# Patient Record
Sex: FEMALE | Race: WHITE | NOT HISPANIC OR LATINO
[De-identification: names, ages, dates, MRNs, and addresses within clinical notes are randomized per-mention and may not be internally consistent; named-entity substitution may affect disease eponyms.]

---

## 2017-01-05 ENCOUNTER — MEDICATION RENEWAL (OUTPATIENT)
Age: 67
End: 2017-01-05

## 2017-01-30 ENCOUNTER — APPOINTMENT (OUTPATIENT)
Dept: SURGERY | Facility: CLINIC | Age: 67
End: 2017-01-30

## 2017-01-30 ENCOUNTER — MEDICATION RENEWAL (OUTPATIENT)
Age: 67
End: 2017-01-30

## 2017-01-30 VITALS
WEIGHT: 293 LBS | HEART RATE: 84 BPM | SYSTOLIC BLOOD PRESSURE: 195 MMHG | TEMPERATURE: 97.6 F | BODY MASS INDEX: 48.82 KG/M2 | OXYGEN SATURATION: 97 % | RESPIRATION RATE: 16 BRPM | DIASTOLIC BLOOD PRESSURE: 123 MMHG | HEIGHT: 65 IN

## 2017-01-30 DIAGNOSIS — Z83.3 FAMILY HISTORY OF DIABETES MELLITUS: ICD-10-CM

## 2017-01-30 DIAGNOSIS — Z80.51 FAMILY HISTORY OF MALIGNANT NEOPLASM OF KIDNEY: ICD-10-CM

## 2017-01-30 DIAGNOSIS — E66.01 MORBID (SEVERE) OBESITY DUE TO EXCESS CALORIES: ICD-10-CM

## 2017-01-30 DIAGNOSIS — G89.29 DORSALGIA, UNSPECIFIED: ICD-10-CM

## 2017-01-30 DIAGNOSIS — M19.90 UNSPECIFIED OSTEOARTHRITIS, UNSPECIFIED SITE: ICD-10-CM

## 2017-01-30 DIAGNOSIS — M54.9 DORSALGIA, UNSPECIFIED: ICD-10-CM

## 2017-01-30 DIAGNOSIS — E78.00 PURE HYPERCHOLESTEROLEMIA, UNSPECIFIED: ICD-10-CM

## 2017-01-30 DIAGNOSIS — J45.909 UNSPECIFIED ASTHMA, UNCOMPLICATED: ICD-10-CM

## 2017-01-30 RX ORDER — SERTRALINE 25 MG/1
25 TABLET, FILM COATED ORAL
Refills: 0 | Status: ACTIVE | COMMUNITY

## 2017-02-03 ENCOUNTER — MEDICATION RENEWAL (OUTPATIENT)
Age: 67
End: 2017-02-03

## 2017-05-01 ENCOUNTER — OUTPATIENT (OUTPATIENT)
Dept: OUTPATIENT SERVICES | Facility: HOSPITAL | Age: 67
LOS: 1 days | End: 2017-05-01
Payer: MEDICAID

## 2017-05-01 DIAGNOSIS — Z96.651 PRESENCE OF RIGHT ARTIFICIAL KNEE JOINT: Chronic | ICD-10-CM

## 2017-05-05 DIAGNOSIS — R69 ILLNESS, UNSPECIFIED: ICD-10-CM

## 2017-05-08 ENCOUNTER — RX RENEWAL (OUTPATIENT)
Age: 67
End: 2017-05-08

## 2017-05-16 ENCOUNTER — APPOINTMENT (OUTPATIENT)
Dept: CARDIOLOGY | Facility: CLINIC | Age: 67
End: 2017-05-16

## 2017-05-17 ENCOUNTER — APPOINTMENT (OUTPATIENT)
Dept: CARDIOLOGY | Facility: CLINIC | Age: 67
End: 2017-05-17

## 2017-05-30 ENCOUNTER — RX RENEWAL (OUTPATIENT)
Age: 67
End: 2017-05-30

## 2017-06-01 PROCEDURE — G9001: CPT

## 2017-06-06 ENCOUNTER — MEDICATION RENEWAL (OUTPATIENT)
Age: 67
End: 2017-06-06

## 2017-06-14 ENCOUNTER — RX RENEWAL (OUTPATIENT)
Age: 67
End: 2017-06-14

## 2017-06-28 ENCOUNTER — MEDICATION RENEWAL (OUTPATIENT)
Age: 67
End: 2017-06-28

## 2017-06-29 ENCOUNTER — APPOINTMENT (OUTPATIENT)
Dept: CARDIOLOGY | Facility: CLINIC | Age: 67
End: 2017-06-29

## 2017-07-06 ENCOUNTER — APPOINTMENT (OUTPATIENT)
Dept: CARDIOLOGY | Facility: CLINIC | Age: 67
End: 2017-07-06
Payer: MEDICARE

## 2017-07-06 ENCOUNTER — NON-APPOINTMENT (OUTPATIENT)
Age: 67
End: 2017-07-06

## 2017-07-06 VITALS
TEMPERATURE: 98.4 F | DIASTOLIC BLOOD PRESSURE: 85 MMHG | WEIGHT: 293 LBS | OXYGEN SATURATION: 97 % | BODY MASS INDEX: 48.82 KG/M2 | SYSTOLIC BLOOD PRESSURE: 145 MMHG | HEART RATE: 80 BPM | HEIGHT: 65 IN

## 2017-07-06 PROCEDURE — 93000 ELECTROCARDIOGRAM COMPLETE: CPT

## 2017-07-06 PROCEDURE — 99215 OFFICE O/P EST HI 40 MIN: CPT

## 2017-07-28 ENCOUNTER — RX RENEWAL (OUTPATIENT)
Age: 67
End: 2017-07-28

## 2017-08-07 ENCOUNTER — RX RENEWAL (OUTPATIENT)
Age: 67
End: 2017-08-07

## 2017-08-11 ENCOUNTER — EMERGENCY (EMERGENCY)
Facility: HOSPITAL | Age: 67
LOS: 1 days | Discharge: HOME | End: 2017-08-11
Attending: EMERGENCY MEDICINE | Admitting: EMERGENCY MEDICINE
Payer: MEDICARE

## 2017-08-11 VITALS
SYSTOLIC BLOOD PRESSURE: 127 MMHG | RESPIRATION RATE: 20 BRPM | DIASTOLIC BLOOD PRESSURE: 68 MMHG | OXYGEN SATURATION: 99 % | TEMPERATURE: 99 F | HEART RATE: 94 BPM

## 2017-08-11 VITALS
OXYGEN SATURATION: 95 % | RESPIRATION RATE: 20 BRPM | DIASTOLIC BLOOD PRESSURE: 75 MMHG | HEART RATE: 75 BPM | SYSTOLIC BLOOD PRESSURE: 138 MMHG

## 2017-08-11 DIAGNOSIS — Z96.651 PRESENCE OF RIGHT ARTIFICIAL KNEE JOINT: Chronic | ICD-10-CM

## 2017-08-11 PROCEDURE — 99283 EMERGENCY DEPT VISIT LOW MDM: CPT | Mod: 25

## 2017-08-11 PROCEDURE — 73562 X-RAY EXAM OF KNEE 3: CPT | Mod: 26,50

## 2017-08-11 PROCEDURE — 73562 X-RAY EXAM OF KNEE 3: CPT

## 2017-08-11 RX ORDER — OXYCODONE HYDROCHLORIDE 5 MG/1
1 TABLET ORAL
Qty: 12 | Refills: 0 | OUTPATIENT
Start: 2017-08-11 | End: 2017-08-14

## 2017-08-11 RX ORDER — OXYCODONE HYDROCHLORIDE 5 MG/1
5 TABLET ORAL ONCE
Qty: 0 | Refills: 0 | Status: DISCONTINUED | OUTPATIENT
Start: 2017-08-11 | End: 2017-08-11

## 2017-08-11 RX ADMIN — OXYCODONE HYDROCHLORIDE 5 MILLIGRAM(S): 5 TABLET ORAL at 03:27

## 2017-08-11 NOTE — ED PROVIDER NOTE - ATTENDING CONTRIBUTION TO CARE
Patient with baseline arthritis of L knee, s/p R knee replacement approx 8 years ago presenting with intermittent non traumatic R knee pain.  Pain is sharp.  Patient takes oxycodone at baseline for back pain.  No fevers or chills, no new leg swelling, no recent travel.    On exam patient morbidly obese, vital signs within normal limits.  RLE without edema, no point tenderness, no noted erythema/warmth.    Plain films with normal appearing replacement joint of R knee, significant arthritis of L knee.  Possible artificial joint failure given duration from surgery and body habitus, no evidence of acute fracture, no evidence of septic arthritis, no evidence of DVT, will pain control and have follow up with orthopedist as outpatient.

## 2017-08-11 NOTE — ED PROVIDER NOTE - OBJECTIVE STATEMENT
67y Female PMH Afib, CHF, DM2, HLD, HTN PSH right knee replacement 8 years ago complaining of pain, knee. Started to have right knee pain 3 days ago. Unsure what started this. Pain getting more severe, unable to bear weight on it. Pain sometimes comes out of nowhere. Also reporting some pain in her left knee.    Orthopedics: Doctor in the [place] ([facility] - Dr. Gamez[facility]  PCP: DR. Zena Biggs ([phone]) 67y Female PMH Afib on Eliquis, CHF, DM2, HLD, HTN PSH right knee replacement 8 years ago complaining of pain, knee. Started to have right knee pain 3 days ago. Unsure what started this. Pain getting more severe, unable to bear weight on it. Pain sometimes comes out of nowhere, happens every 5-10 minutes. Also reporting some pain in her left knee, chronic, worsens when walking. Also with chronic back pain, does not feel like sciatica pain, does not go down the back of her leg. Some mild right lower extremity edema.    Orthopedics: Doctor in the [place] ([facility] - Dr. Gamez)'  PCP: DR. Zena Biggs ([phone])

## 2017-08-11 NOTE — ED ADULT NURSE NOTE - OBJECTIVE STATEMENT
pt c/o "worsening right knee pain x2 days worse today. the pain comes and goes and when I walk on it, the pain is worse." pt denies any injury to the knee. pt took 500 mg tylenol for pain approx. 1.5 hours PTA. swelling noted to right knee

## 2017-08-11 NOTE — ED ADULT NURSE NOTE - PMH
Afib    CHF (congestive heart failure)    DM (diabetes mellitus)    DM (diabetes mellitus), type 2    HLD (hyperlipidemia)    HLD (hyperlipidemia)    HTN (hypertension)    HTN (hypertension)

## 2017-08-11 NOTE — ED PROVIDER NOTE - PHYSICAL EXAMINATION
PE: CONSTITUTIONAL: Well appearing, well nourished, in no apparent distress. ENMT: Airway patent, nasal mucosa clear, mouth with normal mucosa. HEAD: NCAT EYES: PERRL, EOMI bilaterally CARDIAC: RRR, no m/r/g, 1+ pedal edema BL RESPIRATORY: CTA bilaterally, no adventitious sounds GI: Abdomen non-distended, non-tender MSK: Spine appears normal, range of motion is not limited, Left knee TTP, Right knee nontender to palpation NEURO: CNII-XII grossly intact, 5/5 strength, full sensation all extremities, gait intact SKIN: Skin tone normal in color, warm and dry. No evidence of rash.

## 2017-08-11 NOTE — ED PROVIDER NOTE - PLAN OF CARE
1) Please follow-up with your orthopedic doctor within the next 3 days.  Please call today or tomorrow for an appointment.  If you cannot follow-up with your doctor(s), please return to the ED for any urgent issues.  2) If you have any worsening of symptoms or any other concerns please return to the ED immediately.  3) Please continue taking your home medications as directed. Please take OXYCODONE every 6 hours, as needed, for SEVERE pain. Please do not drive, make any important decisions or operate heavy machinery while on this medication.

## 2017-08-11 NOTE — ED PROVIDER NOTE - CARE PLAN
Principal Discharge DX:	Knee pain, right  Instructions for follow-up, activity and diet:	1) Please follow-up with your orthopedic doctor within the next 3 days.  Please call today or tomorrow for an appointment.  If you cannot follow-up with your doctor(s), please return to the ED for any urgent issues.  2) If you have any worsening of symptoms or any other concerns please return to the ED immediately.  3) Please continue taking your home medications as directed. Please take OXYCODONE every 6 hours, as needed, for SEVERE pain. Please do not drive, make any important decisions or operate heavy machinery while on this medication.

## 2017-08-11 NOTE — ED PROVIDER NOTE - NS ED ROS FT
ROS: GENERAL: no fevers, no chills HEENT: no epistaxis, no eye pain, no ear pain, no throat pain CARDIAC: no chest pain, no shortness of breath PULM: no cough, no shortness of breath GI: no nausea, no vomiting, no diarrhea, no abdominal pain, no hematemesis, no bright red blood per rectum : no dysuria, no hematuria EXTREMITIES: no arm pain, + knee pain, +chronic back pain SKIN: no purpura, no petechiae NEURO: no headache, no neck pain, no loss of strength/sensation HEME: no easy bruising, no easy bleeding

## 2017-11-06 ENCOUNTER — RX RENEWAL (OUTPATIENT)
Age: 67
End: 2017-11-06

## 2017-12-14 ENCOUNTER — RX RENEWAL (OUTPATIENT)
Age: 67
End: 2017-12-14

## 2018-01-09 ENCOUNTER — NON-APPOINTMENT (OUTPATIENT)
Age: 68
End: 2018-01-09

## 2018-01-09 ENCOUNTER — APPOINTMENT (OUTPATIENT)
Dept: CARDIOLOGY | Facility: CLINIC | Age: 68
End: 2018-01-09
Payer: MEDICARE

## 2018-01-09 VITALS — OXYGEN SATURATION: 99 % | WEIGHT: 293 LBS | HEART RATE: 88 BPM | BODY MASS INDEX: 52.09 KG/M2

## 2018-01-09 DIAGNOSIS — I65.23 OCCLUSION AND STENOSIS OF BILATERAL CAROTID ARTERIES: ICD-10-CM

## 2018-01-09 PROCEDURE — 99214 OFFICE O/P EST MOD 30 MIN: CPT

## 2018-01-09 PROCEDURE — 93000 ELECTROCARDIOGRAM COMPLETE: CPT

## 2018-01-09 PROCEDURE — 36415 COLL VENOUS BLD VENIPUNCTURE: CPT

## 2018-01-10 ENCOUNTER — NON-APPOINTMENT (OUTPATIENT)
Age: 68
End: 2018-01-10

## 2018-01-12 LAB
ALBUMIN SERPL ELPH-MCNC: 4.1 G/DL
ALP BLD-CCNC: 84 U/L
ALT SERPL-CCNC: 25 U/L
ANION GAP SERPL CALC-SCNC: 15 MMOL/L
AST SERPL-CCNC: 28 U/L
BASOPHILS # BLD AUTO: 0.02 K/UL
BASOPHILS NFR BLD AUTO: 0.3 %
BILIRUB SERPL-MCNC: 0.4 MG/DL
BUN SERPL-MCNC: 27 MG/DL
CALCIUM SERPL-MCNC: 10.7 MG/DL
CHLORIDE SERPL-SCNC: 100 MMOL/L
CHOLEST SERPL-MCNC: 159 MG/DL
CHOLEST/HDLC SERPL: 2.9 RATIO
CO2 SERPL-SCNC: 26 MMOL/L
CREAT SERPL-MCNC: 1.13 MG/DL
EOSINOPHIL # BLD AUTO: 0.12 K/UL
EOSINOPHIL NFR BLD AUTO: 1.7 %
GLUCOSE SERPL-MCNC: 131 MG/DL
HBA1C MFR BLD HPLC: 7.2 %
HCT VFR BLD CALC: 43.5 %
HDLC SERPL-MCNC: 54 MG/DL
HGB BLD-MCNC: 13.8 G/DL
IMM GRANULOCYTES NFR BLD AUTO: 0.1 %
LDLC SERPL CALC-MCNC: 79 MG/DL
LYMPHOCYTES # BLD AUTO: 1.98 K/UL
LYMPHOCYTES NFR BLD AUTO: 28.1 %
MAN DIFF?: NORMAL
MCHC RBC-ENTMCNC: 29.3 PG
MCHC RBC-ENTMCNC: 31.7 GM/DL
MCV RBC AUTO: 92.4 FL
MONOCYTES # BLD AUTO: 0.54 K/UL
MONOCYTES NFR BLD AUTO: 7.7 %
NEUTROPHILS # BLD AUTO: 4.38 K/UL
NEUTROPHILS NFR BLD AUTO: 62.1 %
NT-PROBNP SERPL-MCNC: 1427 PG/ML
PLATELET # BLD AUTO: 209 K/UL
POTASSIUM SERPL-SCNC: 4.4 MMOL/L
PROT SERPL-MCNC: 8.2 G/DL
RBC # BLD: 4.71 M/UL
RBC # FLD: 13 %
SODIUM SERPL-SCNC: 141 MMOL/L
TRIGL SERPL-MCNC: 132 MG/DL
WBC # FLD AUTO: 7.05 K/UL

## 2018-01-23 ENCOUNTER — RX RENEWAL (OUTPATIENT)
Age: 68
End: 2018-01-23

## 2018-03-07 ENCOUNTER — RX RENEWAL (OUTPATIENT)
Age: 68
End: 2018-03-07

## 2018-06-22 ENCOUNTER — EMERGENCY (EMERGENCY)
Facility: HOSPITAL | Age: 68
LOS: 1 days | Discharge: HOME | End: 2018-06-22
Attending: EMERGENCY MEDICINE
Payer: MEDICARE

## 2018-06-22 VITALS
WEIGHT: 293 LBS | RESPIRATION RATE: 18 BRPM | HEIGHT: 66 IN | HEART RATE: 91 BPM | SYSTOLIC BLOOD PRESSURE: 132 MMHG | OXYGEN SATURATION: 96 % | TEMPERATURE: 98 F | DIASTOLIC BLOOD PRESSURE: 88 MMHG

## 2018-06-22 DIAGNOSIS — Z96.651 PRESENCE OF RIGHT ARTIFICIAL KNEE JOINT: Chronic | ICD-10-CM

## 2018-06-22 PROCEDURE — 99283 EMERGENCY DEPT VISIT LOW MDM: CPT

## 2018-06-22 PROCEDURE — 73030 X-RAY EXAM OF SHOULDER: CPT | Mod: 26,RT

## 2018-06-22 RX ORDER — ONDANSETRON 8 MG/1
4 TABLET, FILM COATED ORAL ONCE
Qty: 0 | Refills: 0 | Status: COMPLETED | OUTPATIENT
Start: 2018-06-22 | End: 2018-06-22

## 2018-06-22 RX ORDER — OXYCODONE AND ACETAMINOPHEN 5; 325 MG/1; MG/1
1 TABLET ORAL ONCE
Qty: 0 | Refills: 0 | Status: DISCONTINUED | OUTPATIENT
Start: 2018-06-22 | End: 2018-06-22

## 2018-06-22 RX ORDER — MORPHINE SULFATE 50 MG/1
8 CAPSULE, EXTENDED RELEASE ORAL ONCE
Qty: 0 | Refills: 0 | Status: DISCONTINUED | OUTPATIENT
Start: 2018-06-22 | End: 2018-06-22

## 2018-06-22 NOTE — ED ADULT TRIAGE NOTE - CHIEF COMPLAINT QUOTE
BIBA with c/o severe pain to rt. shoulder,s/p slipped and fell in her kitchen and hit the right shoulder on  the floor,denies head injury as per ems

## 2018-06-22 NOTE — ED PROVIDER NOTE - MUSCULOSKELETAL MINIMAL EXAM
right shoulder and humeral pain. distalr giht pulses intact. hand  strength strong. neuro intact. cannot range shoulder 2/2 pain.

## 2018-06-22 NOTE — ED PROVIDER NOTE - OBJECTIVE STATEMENT
68F p/w right shoulder pain. mechanical slip and fall. Patient slipped while carrying food at home. fell onto right upper arm / shoulder. c/o shoulder pain. no Head injury. no N/V. no LOC. no pain elsewhere. Patient on eloquis for afib.

## 2018-06-23 VITALS
SYSTOLIC BLOOD PRESSURE: 123 MMHG | DIASTOLIC BLOOD PRESSURE: 74 MMHG | HEART RATE: 90 BPM | TEMPERATURE: 98 F | RESPIRATION RATE: 20 BRPM | OXYGEN SATURATION: 98 %

## 2018-06-23 PROCEDURE — 73030 X-RAY EXAM OF SHOULDER: CPT

## 2018-06-23 PROCEDURE — 96372 THER/PROPH/DIAG INJ SC/IM: CPT | Mod: 76

## 2018-06-23 PROCEDURE — 99283 EMERGENCY DEPT VISIT LOW MDM: CPT | Mod: 25

## 2018-06-23 RX ADMIN — MORPHINE SULFATE 8 MILLIGRAM(S): 50 CAPSULE, EXTENDED RELEASE ORAL at 01:40

## 2018-06-23 RX ADMIN — ONDANSETRON 4 MILLIGRAM(S): 8 TABLET, FILM COATED ORAL at 00:00

## 2018-06-23 RX ADMIN — MORPHINE SULFATE 8 MILLIGRAM(S): 50 CAPSULE, EXTENDED RELEASE ORAL at 00:00

## 2018-06-23 NOTE — ED ADULT NURSE NOTE - OBJECTIVE STATEMENT
presented  to ed s/p fall c/o right shoulder pain.  Patient slipped while carrying food at home. fell onto right upper arm.denies head  trauma. noLOC . medicated with  morphine & zofran

## 2018-06-24 NOTE — ED PROCEDURE NOTE - NS ED PERI VASCULAR NEG
capillary refill time < 2 seconds/no paresthesia/fingers/toes warm to touch/no cyanosis of extremity/no swelling

## 2018-06-27 ENCOUNTER — APPOINTMENT (OUTPATIENT)
Dept: CARDIOLOGY | Facility: CLINIC | Age: 68
End: 2018-06-27
Payer: MEDICARE

## 2018-06-27 ENCOUNTER — NON-APPOINTMENT (OUTPATIENT)
Age: 68
End: 2018-06-27

## 2018-06-27 VITALS
WEIGHT: 208 LBS | DIASTOLIC BLOOD PRESSURE: 85 MMHG | BODY MASS INDEX: 34.66 KG/M2 | OXYGEN SATURATION: 95 % | SYSTOLIC BLOOD PRESSURE: 136 MMHG | HEART RATE: 104 BPM | HEIGHT: 65 IN

## 2018-06-27 PROCEDURE — 93000 ELECTROCARDIOGRAM COMPLETE: CPT

## 2018-06-27 PROCEDURE — 99215 OFFICE O/P EST HI 40 MIN: CPT

## 2018-06-27 RX ORDER — DICLOFENAC SODIUM 20 MG/G
2 SOLUTION TOPICAL
Qty: 112 | Refills: 0 | Status: DISCONTINUED | COMMUNITY
Start: 2018-06-22

## 2018-06-27 RX ORDER — GABAPENTIN 300 MG/1
300 TABLET, FILM COATED ORAL
Qty: 60 | Refills: 0 | Status: DISCONTINUED | COMMUNITY
Start: 2018-06-22

## 2018-06-27 RX ORDER — METFORMIN HYDROCHLORIDE 500 MG/1
500 TABLET, COATED ORAL
Qty: 30 | Refills: 0 | Status: DISCONTINUED | COMMUNITY
Start: 2018-01-17

## 2018-06-27 RX ORDER — PREGABALIN 75 MG/1
75 CAPSULE ORAL
Qty: 90 | Refills: 0 | Status: DISCONTINUED | COMMUNITY
Start: 2018-06-14

## 2018-06-27 RX ORDER — AZITHROMYCIN 250 MG/1
250 TABLET, FILM COATED ORAL
Qty: 6 | Refills: 0 | Status: DISCONTINUED | COMMUNITY
Start: 2018-06-07

## 2018-06-27 RX ORDER — NEBIVOLOL HYDROCHLORIDE 20 MG/1
20 TABLET ORAL
Qty: 30 | Refills: 0 | Status: DISCONTINUED | COMMUNITY
Start: 2018-03-07

## 2018-06-27 RX ORDER — OXYCODONE AND ACETAMINOPHEN 5; 325 MG/1; MG/1
5-325 TABLET ORAL
Qty: 32 | Refills: 0 | Status: DISCONTINUED | COMMUNITY
Start: 2018-06-23

## 2018-06-27 RX ORDER — BUDESONIDE AND FORMOTEROL FUMARATE DIHYDRATE 80; 4.5 UG/1; UG/1
80-4.5 AEROSOL RESPIRATORY (INHALATION)
Qty: 10 | Refills: 0 | Status: DISCONTINUED | COMMUNITY
Start: 2018-02-27

## 2018-06-27 RX ORDER — PIOGLITAZONE HYDROCHLORIDE 15 MG/1
15 TABLET ORAL
Qty: 30 | Refills: 0 | Status: DISCONTINUED | COMMUNITY
Start: 2018-01-17

## 2018-06-27 RX ORDER — ALBUTEROL SULFATE 90 UG/1
108 (90 BASE) AEROSOL, METERED RESPIRATORY (INHALATION)
Qty: 18 | Refills: 0 | Status: DISCONTINUED | COMMUNITY
Start: 2018-02-27

## 2018-06-27 RX ORDER — TRAMADOL HYDROCHLORIDE 50 MG/1
50 TABLET, COATED ORAL
Qty: 60 | Refills: 0 | Status: DISCONTINUED | COMMUNITY
Start: 2018-06-26

## 2018-11-19 ENCOUNTER — RX RENEWAL (OUTPATIENT)
Age: 68
End: 2018-11-19

## 2019-01-08 ENCOUNTER — RX RENEWAL (OUTPATIENT)
Age: 69
End: 2019-01-08

## 2019-01-18 ENCOUNTER — RX RENEWAL (OUTPATIENT)
Age: 69
End: 2019-01-18

## 2019-02-25 NOTE — ED ADULT NURSE NOTE - NS PRO PASSIVE SMOKE EXP
Per Dr Snider:  Jeanette Snider MD   You 3 days ago      She can skip the appointment. She can go back to work in 7-10 days if she does not do lifting. She will need Preop medical clearance.    Routing Comment      Left message for patient to call back.     Unknown

## 2019-02-27 ENCOUNTER — RX RENEWAL (OUTPATIENT)
Age: 69
End: 2019-02-27

## 2019-04-25 ENCOUNTER — RX RENEWAL (OUTPATIENT)
Age: 69
End: 2019-04-25

## 2019-04-29 ENCOUNTER — RX RENEWAL (OUTPATIENT)
Age: 69
End: 2019-04-29

## 2019-05-21 ENCOUNTER — RX RENEWAL (OUTPATIENT)
Age: 69
End: 2019-05-21

## 2019-05-24 ENCOUNTER — RESULT REVIEW (OUTPATIENT)
Age: 69
End: 2019-05-24

## 2019-05-29 ENCOUNTER — RX RENEWAL (OUTPATIENT)
Age: 69
End: 2019-05-29

## 2019-06-21 ENCOUNTER — RX RENEWAL (OUTPATIENT)
Age: 69
End: 2019-06-21

## 2019-06-27 ENCOUNTER — MEDICATION RENEWAL (OUTPATIENT)
Age: 69
End: 2019-06-27

## 2019-06-27 RX ORDER — NEBIVOLOL HYDROCHLORIDE 10 MG/1
10 TABLET ORAL
Qty: 30 | Refills: 0 | Status: ACTIVE | COMMUNITY
Start: 2017-01-30 | End: 1900-01-01

## 2019-09-23 ENCOUNTER — RX RENEWAL (OUTPATIENT)
Age: 69
End: 2019-09-23

## 2020-01-09 PROBLEM — I10 ESSENTIAL (PRIMARY) HYPERTENSION: Chronic | Status: ACTIVE | Noted: 2018-06-22

## 2020-01-09 PROBLEM — I48.91 UNSPECIFIED ATRIAL FIBRILLATION: Chronic | Status: ACTIVE | Noted: 2018-06-22

## 2020-01-09 PROBLEM — E11.9 TYPE 2 DIABETES MELLITUS WITHOUT COMPLICATIONS: Chronic | Status: ACTIVE | Noted: 2018-06-22

## 2020-01-28 ENCOUNTER — NON-APPOINTMENT (OUTPATIENT)
Age: 70
End: 2020-01-28

## 2020-01-28 ENCOUNTER — APPOINTMENT (OUTPATIENT)
Dept: CARDIOLOGY | Facility: CLINIC | Age: 70
End: 2020-01-28
Payer: MEDICARE

## 2020-01-28 VITALS — SYSTOLIC BLOOD PRESSURE: 168 MMHG | HEART RATE: 80 BPM | DIASTOLIC BLOOD PRESSURE: 79 MMHG

## 2020-01-28 VITALS
BODY MASS INDEX: 46.15 KG/M2 | DIASTOLIC BLOOD PRESSURE: 111 MMHG | WEIGHT: 277 LBS | HEIGHT: 65 IN | SYSTOLIC BLOOD PRESSURE: 162 MMHG | HEART RATE: 80 BPM | OXYGEN SATURATION: 98 %

## 2020-01-28 DIAGNOSIS — I25.10 ATHEROSCLEROTIC HEART DISEASE OF NATIVE CORONARY ARTERY W/OUT ANGINA PECTORIS: ICD-10-CM

## 2020-01-28 DIAGNOSIS — I10 ESSENTIAL (PRIMARY) HYPERTENSION: ICD-10-CM

## 2020-01-28 DIAGNOSIS — E55.9 VITAMIN D DEFICIENCY, UNSPECIFIED: ICD-10-CM

## 2020-01-28 PROCEDURE — 99214 OFFICE O/P EST MOD 30 MIN: CPT

## 2020-01-28 PROCEDURE — 93000 ELECTROCARDIOGRAM COMPLETE: CPT

## 2020-01-28 NOTE — PHYSICAL EXAM
[Normal Appearance] : normal appearance [Well Groomed] : well groomed [General Appearance - In No Acute Distress] : no acute distress [No Deformities] : no deformities [Eyelids - No Xanthelasma] : the eyelids demonstrated no xanthelasmas [Normal Conjunctiva] : the conjunctiva exhibited no abnormalities [No Oral Pallor] : no oral pallor [Normal Oral Mucosa] : normal oral mucosa [No Oral Cyanosis] : no oral cyanosis [Respiration, Rhythm And Depth] : normal respiratory rhythm and effort [Auscultation Breath Sounds / Voice Sounds] : lungs were clear to auscultation bilaterally [Exaggerated Use Of Accessory Muscles For Inspiration] : no accessory muscle use [Murmurs] : no murmurs present [Heart Sounds] : normal S1 and S2 [Heart Rate And Rhythm] : heart rate and rhythm were normal [Abdomen Soft] : soft [Abdomen Mass (___ Cm)] : no abdominal mass palpated [Abdomen Tenderness] : non-tender [Nail Clubbing] : no clubbing of the fingernails [Abnormal Walk] : normal gait [Cyanosis, Localized] : no localized cyanosis [Petechial Hemorrhages (___cm)] : no petechial hemorrhages [Skin Color & Pigmentation] : normal skin color and pigmentation [] : no rash [No Skin Ulcers] : no skin ulcer [No Venous Stasis] : no venous stasis [Skin Lesions] : no skin lesions [No Xanthoma] : no  xanthoma was observed [Oriented To Time, Place, And Person] : oriented to person, place, and time [Mood] : the mood was normal [Affect] : the affect was normal [No Anxiety] : not feeling anxious [FreeTextEntry1] : Mild chronic nonpitting edema.  Pedal pulses intact

## 2020-01-28 NOTE — HISTORY OF PRESENT ILLNESS
[FreeTextEntry1] : March 10, 2016. Long complicated story. Patient doesn't speak much English but is here with her daughter. She has had hypertension for probably 40 years and used to be difficult to control even with labetalol and clonidine. In addition she has had diabetes for about 6 years and hyperlipidemia for about 4 years. She presented to [facility] on March 13, 2015 complaining of 2-3 weeks of dyspnea on exertion and edema. At the time of admission she was in rapid atrial fibrillation and her blood pressure was 220/160. She was treated by Dr. Nicole  initially with Cardizem, followed by a SHAKEEL cardioversion and discharged on sotalol 80 q.12 h. and Xarelto. In addition her regimen was changed from clonidine and labetalol initially to lisinopril but she developed a cough and now she is on losartan//12.5 along with amlodipine 5 mg. On this regimen she has had fairly good blood pressure control.  She was also changed from Xarelto to Eliquis 5 mg b.i.d. About 4 months ago he saw her and she was in atrial fibrillation. It may be that Dr. Nicole in conjunction with her PCP lowered the sotalol to once a day, possibly concerned about asthma or beta blocker side effects. In any case she was in atrial fibrillation again so he changed her to Multaq. He did not see her again until 4 months later and at that point (3/3) she was still in atrial fibrillation and he said to stop the Multaq and gave her a prescription for metoprolol and possibly digoxin. The daughter was somewhat concerned about this and did not fill either prescription or stop the Multaq. At the same time the daughter was having her mother evaluated for weight-reduction surgery and now she is here for my evaluation. She does complain about orthopnea and pedal edema and cannot walk very far without shortness of breath. No exertional chest pain. \par During the hospitalization in March of 2015 she had a coronary angiogram. There was nonobstructive coronary artery disease with a 25% lesion in the left main and 50% lesion in the mid LAD and first diagonal. No significant lesions in the right circumflex. LVEF estimated at 55%. High LVEDP, pulmonary pressures, systemic resistance. Her echocardiogram was suboptimal but she did have a transesophageal echo as well and she was felt to have normal LV systolic function with concentric LVH, the extent of which was not mentioned on either the transthoracic and transesophageal echo.  \par March 18, 2016. Patient returns for followup and echo. She doesn't feel any different off Multaq and on bisoprolol. Heart rate is well controlled in the 70s. Echocardiogram was suboptimal because endocardium could not be well visualized, but grossly normal LV function and only mild left atrial enlargement. No severe valvular disease. RVSP 41 mm of mercury. I had a long with the patient and her daughter who was with her as she is compliant with the Eliquis and, therefore, we're starting flecainide 50 mg q.12 h.\par \par September 19, 2016.  Patient is here for preop evaluation for laparoscopic duodenal switch procedure.  Since March, the patient did not tolerate the Flecainide and did not convert to sinus rhythm and we have elected to just control her rate and leave her on anticoagulation. She has had labile hypertension with severe "white coat" hypertension at times, and other times fairly normal blood pressures on just losartan/HCT, and bisoprolol. She has managed to lose about 13 pounds. She denies exertional chest pain or change in her shortness of breath. Her EKG was within normal limits other than her atrial fibrillation, and I elected to change her by bisoprolol to bystolic 10 mg daily.\par December 19, 2016. Weight reduction surgeons want her to lose more weight before the surgery, but she is unable to. She has no other complaints except her blood pressure remains erratic. Her internist lowered her losartan to 25 mg, probably by error  as she was on Losartan//25. Her blood pressure here was still elevated, and if her labs are within normal limits. I will resume Losartan//25. I also recommended she consider seeing the bariatric surgery doctors at [facility]. We also discussed the pros and cons of Eliquis versus Xarelto as her insurance will only cover the Xarelto.\par July 6, 2017. First visit since December, as I would not refill her medications. She brought with her labs from [facility] with a BUN of 28 and creatinine of 0.9. Potassium 4.6. Cholesterol 160, HDL 50, LDL 91, triglycerides 96, glucose 121, with hemoglobin A1c 6.8, C-reactive protein, negative, TSH 3.2, normal CBC, vitamin D. Positive microalbumin. In addition, she had carotid Dopplers that showed a 45% stenosis right carotid 20-30% right mid ICA. 42% left mid common carotid with 40% left carotid bulb with 46% left proximal ICA 50-70% left mid and distal RCA. Internist was concerned because these #"s are worse than one year ago.\par January 9, 2018. Patient returns in followup. Multiple nonspecific complaints with atypical chest pain, shortness of breath, nothing that fits for exertional angina or for congestive heart failure. Patient has been unable to lose weight and is up another 10-13 pounds. Exam is otherwise unremarkable and the EKG is atrial fibrillation with ventricular rate controlled and otherwise within normal limits. Patient again claims whenever I increased her bystolic, her blood pressure became too low.\par June 27, 2018. Seen by Dr. Lisker. She was seen today for preoperative cardiovascular evaluation prior to her planned operative repair of a right proximal humerus and humerus shaft fracture scheduled on July 2, 2018.\par She is a 68-year-old morbidly obese woman with history of chronic atrial fibrillation and moderate nonobstructive coronary artery disease who slipped and fell and broke her right arm. She reports being in significant pain despite pain medication use including Percocet and Toradol.\par She has been on and off Eliquis over the past week, as she thought that her surgery would be sooner. She is also taking her blood pressure medications intermittently because of low blood pressure readings that she treated to pain treatment with narcotics.\par January 28, 2020.  First visit in a year and a half.  According to the daughter at one point she had lost 70 pounds (down to 247) and her blood pressure was much better controlled but since then she has put back some of the weight down for other reasons that are unclear her blood pressure seems to be high again.  She had been on Bystolic and had been taking as much as 2 or 3 times a day.  Still has some problems with her hand and shoulder after her surgery.  No other change in medication.  Remains in atrial fibrillation with ventricular rate of 89 and otherwise normal ECG.  Using a thigh blood pressure cuff on her right arm her blood pressure was not quite so alarmingly high (168/79).\par \par

## 2020-01-28 NOTE — REASON FOR VISIT
[FreeTextEntry1] : Followup for a woman with obesity, awaiting weight reduction surgery (canceled 3 times), along with atrial fibrillation, Hypertension, and nonobstructive coronary artery disease. s/p right arm surgery.

## 2020-01-28 NOTE — REVIEW OF SYSTEMS
[see HPI] : see HPI [Dyspnea on exertion] : dyspnea during exertion [Lower Back Pain] : lower back pain [Negative] : Heme/Lymph [Recent Weight Gain (___ Lbs)] : recent [unfilled] ~Ulb weight gain [Recent Weight Loss (___ Lbs)] : no recent weight loss [Shortness Of Breath] : no shortness of breath [Chest  Pressure] : no chest pressure [Chest Pain] : no chest pain [Lower Ext Edema] : no extremity edema [Palpitations] : no palpitations [Leg Claudication] : no intermittent leg claudication [Depression] : no depression [Suicidal] : not suicidal

## 2020-01-29 LAB
ALBUMIN SERPL ELPH-MCNC: 4.5 G/DL
ALP BLD-CCNC: 78 U/L
ALT SERPL-CCNC: 28 U/L
ANION GAP SERPL CALC-SCNC: 11 MMOL/L
AST SERPL-CCNC: 23 U/L
BASOPHILS # BLD AUTO: 0.05 K/UL
BASOPHILS NFR BLD AUTO: 0.7 %
BILIRUB SERPL-MCNC: 0.5 MG/DL
BUN SERPL-MCNC: 30 MG/DL
CALCIUM SERPL-MCNC: 10.6 MG/DL
CHLORIDE SERPL-SCNC: 102 MMOL/L
CHOLEST SERPL-MCNC: 162 MG/DL
CHOLEST/HDLC SERPL: 2.7 RATIO
CO2 SERPL-SCNC: 27 MMOL/L
CREAT SERPL-MCNC: 1.05 MG/DL
EOSINOPHIL # BLD AUTO: 0.1 K/UL
EOSINOPHIL NFR BLD AUTO: 1.5 %
ESTIMATED AVERAGE GLUCOSE: 134 MG/DL
GLUCOSE SERPL-MCNC: 111 MG/DL
HBA1C MFR BLD HPLC: 6.3 %
HCT VFR BLD CALC: 45.5 %
HDLC SERPL-MCNC: 59 MG/DL
HGB BLD-MCNC: 13.7 G/DL
IMM GRANULOCYTES NFR BLD AUTO: 0.3 %
LDLC SERPL CALC-MCNC: 84 MG/DL
LYMPHOCYTES # BLD AUTO: 1.53 K/UL
LYMPHOCYTES NFR BLD AUTO: 22.3 %
MAN DIFF?: NORMAL
MCHC RBC-ENTMCNC: 29 PG
MCHC RBC-ENTMCNC: 30.1 GM/DL
MCV RBC AUTO: 96.4 FL
MONOCYTES # BLD AUTO: 0.42 K/UL
MONOCYTES NFR BLD AUTO: 6.1 %
NEUTROPHILS # BLD AUTO: 4.75 K/UL
NEUTROPHILS NFR BLD AUTO: 69.1 %
NT-PROBNP SERPL-MCNC: 1294 PG/ML
PLATELET # BLD AUTO: 218 K/UL
POTASSIUM SERPL-SCNC: 4.9 MMOL/L
PROT SERPL-MCNC: 7.2 G/DL
RBC # BLD: 4.72 M/UL
RBC # FLD: 12.1 %
SODIUM SERPL-SCNC: 141 MMOL/L
TRIGL SERPL-MCNC: 94 MG/DL
TSH SERPL-ACNC: 3.04 UIU/ML
WBC # FLD AUTO: 6.87 K/UL

## 2020-07-28 ENCOUNTER — RX RENEWAL (OUTPATIENT)
Age: 70
End: 2020-07-28

## 2020-09-03 ENCOUNTER — RX RENEWAL (OUTPATIENT)
Age: 70
End: 2020-09-03

## 2020-11-02 ENCOUNTER — RX RENEWAL (OUTPATIENT)
Age: 70
End: 2020-11-02

## 2020-11-02 RX ORDER — AMLODIPINE BESYLATE 5 MG/1
5 TABLET ORAL DAILY
Qty: 90 | Refills: 0 | Status: ACTIVE | COMMUNITY
Start: 2020-01-29 | End: 1900-01-01

## 2020-11-04 ENCOUNTER — EMERGENCY (EMERGENCY)
Facility: HOSPITAL | Age: 70
LOS: 1 days | Discharge: HOME | End: 2020-11-04
Attending: EMERGENCY MEDICINE
Payer: MEDICARE

## 2020-11-04 VITALS
SYSTOLIC BLOOD PRESSURE: 210 MMHG | OXYGEN SATURATION: 99 % | HEART RATE: 106 BPM | TEMPERATURE: 98 F | HEIGHT: 66 IN | DIASTOLIC BLOOD PRESSURE: 85 MMHG | WEIGHT: 259.93 LBS | RESPIRATION RATE: 18 BRPM

## 2020-11-04 DIAGNOSIS — Z96.651 PRESENCE OF RIGHT ARTIFICIAL KNEE JOINT: Chronic | ICD-10-CM

## 2020-11-05 VITALS
TEMPERATURE: 98 F | RESPIRATION RATE: 18 BRPM | SYSTOLIC BLOOD PRESSURE: 145 MMHG | OXYGEN SATURATION: 99 % | HEART RATE: 90 BPM | DIASTOLIC BLOOD PRESSURE: 88 MMHG

## 2020-11-05 PROCEDURE — 99283 EMERGENCY DEPT VISIT LOW MDM: CPT

## 2020-11-05 RX ORDER — OXYMETAZOLINE HYDROCHLORIDE 0.5 MG/ML
2 SPRAY NASAL ONCE
Refills: 0 | Status: COMPLETED | OUTPATIENT
Start: 2020-11-05 | End: 2020-11-05

## 2020-11-05 RX ADMIN — OXYMETAZOLINE HYDROCHLORIDE 2 SPRAY(S): 0.5 SPRAY NASAL at 01:01

## 2020-11-05 NOTE — ED ADULT NURSE NOTE - OBJECTIVE STATEMENT
Pt presents to ED with c/o nosebleed an hour prior to arrival. Pt reports she fell on Friday. Bruise noted to left eye. Pt reports while sitting she noticed her bleeding from her nose.

## 2020-11-05 NOTE — ED PROVIDER NOTE - CLINICAL SUMMARY MEDICAL DECISION MAKING FREE TEXT BOX
70 year old female with epistaxis. PE as above. 70 year old female with epistaxis. PE as above.  direct pressure and afrin and bleeding stopped. observed, no active bleeding. asking to go home. will dc. f/u with ENT. return precautions discussed.

## 2020-11-05 NOTE — ED PROVIDER NOTE - PMH
Afib    Atrial fibrillation    CHF (congestive heart failure)    DM (diabetes mellitus)    DM (diabetes mellitus)    DM (diabetes mellitus), type 2    HLD (hyperlipidemia)    HLD (hyperlipidemia)    HTN (hypertension)    HTN (hypertension)    HTN (hypertension)     yes

## 2020-11-05 NOTE — ED PROVIDER NOTE - OBJECTIVE STATEMENT
70 year old female PMH afib on eliquis, DM, HTN coming in for epistaxis for the past hour. states started spontaneously, unsure which nare it started to come from. states some blood clots present. denies blowing nose, trauma to nose, picking nose. denies all other complaints.

## 2020-11-05 NOTE — ED ADULT NURSE NOTE - INTERVENTIONS DEFINITIONS
Instruct patient to call for assistance/Monitor for mental status changes and reorient to person, place, and time/Reinforce activity limits and safety measures with patient and family/Room bathroom lighting operational/Monitor gait and stability/Physically safe environment: no spills, clutter or unnecessary equipment

## 2020-11-05 NOTE — ED ADULT NURSE NOTE - CHIEF COMPLAINT QUOTE
BIBA for nose bleed x 1 hour, pt on Eliquis, pt has high blood pressure, pt fell 3 days ago and has left orbital bruising

## 2020-11-05 NOTE — ED PROVIDER NOTE - PROGRESS NOTE DETAILS
given afrin and used direct pressure. bleeding stopped. given afrin and used direct pressure. bleeding stopped. asking to go home.

## 2020-11-05 NOTE — ED ADULT NURSE NOTE - CHPI ED NUR SYMPTOMS NEG
no nausea/no numbness/no vomiting/no syncope/no weakness/no chills/no fever/no loss of consciousness/no bleeding gums

## 2020-11-05 NOTE — ED PROVIDER NOTE - PATIENT PORTAL LINK FT
You can access the FollowMyHealth Patient Portal offered by [facility] by registering at the following website: [email or web address]. By joining [facility]’s FollowMyHealth portal, you will also be able to view your health information using other applications (apps) compatible with our system.

## 2022-02-11 ENCOUNTER — APPOINTMENT (OUTPATIENT)
Dept: CARDIOLOGY | Facility: CLINIC | Age: 72
End: 2022-02-11

## 2022-10-20 ENCOUNTER — APPOINTMENT (OUTPATIENT)
Dept: CARDIOLOGY | Facility: CLINIC | Age: 72
End: 2022-10-20

## 2022-10-20 ENCOUNTER — NON-APPOINTMENT (OUTPATIENT)
Age: 72
End: 2022-10-20

## 2022-10-20 VITALS
SYSTOLIC BLOOD PRESSURE: 137 MMHG | BODY MASS INDEX: 46.59 KG/M2 | OXYGEN SATURATION: 100 % | DIASTOLIC BLOOD PRESSURE: 88 MMHG | WEIGHT: 280 LBS | HEART RATE: 79 BPM

## 2022-10-20 DIAGNOSIS — E11.43 TYPE 2 DIABETES MELLITUS WITH DIABETIC AUTONOMIC (POLY)NEUROPATHY: ICD-10-CM

## 2022-10-20 DIAGNOSIS — I48.91 UNSPECIFIED ATRIAL FIBRILLATION: ICD-10-CM

## 2022-10-20 DIAGNOSIS — R06.02 SHORTNESS OF BREATH: ICD-10-CM

## 2022-10-20 DIAGNOSIS — Z01.810 ENCOUNTER FOR PREPROCEDURAL CARDIOVASCULAR EXAMINATION: ICD-10-CM

## 2022-10-20 PROCEDURE — 93000 ELECTROCARDIOGRAM COMPLETE: CPT | Mod: NC

## 2022-10-20 PROCEDURE — 99215 OFFICE O/P EST HI 40 MIN: CPT | Mod: 25

## 2022-10-21 ENCOUNTER — APPOINTMENT (OUTPATIENT)
Dept: CARDIOLOGY | Facility: CLINIC | Age: 72
End: 2022-10-21

## 2022-10-21 PROCEDURE — 93306 TTE W/DOPPLER COMPLETE: CPT

## 2022-10-24 ENCOUNTER — APPOINTMENT (OUTPATIENT)
Dept: CARDIOLOGY | Facility: CLINIC | Age: 72
End: 2022-10-24

## 2022-10-24 PROCEDURE — 93015 CV STRESS TEST SUPVJ I&R: CPT

## 2022-10-24 PROCEDURE — 78452 HT MUSCLE IMAGE SPECT MULT: CPT

## 2022-10-24 PROCEDURE — A9500: CPT

## 2022-10-24 NOTE — REVIEW OF SYSTEMS
[Weight Gain (___ Lbs)] : [unfilled] ~Ulb weight gain [Dyspnea on exertion] : dyspnea during exertion [Chest Discomfort] : no chest discomfort [Lower Ext Edema] : lower extremity edema [Leg Claudication] : no intermittent leg claudication [Palpitations] : no palpitations [Orthopnea] : no orthopnea [PND] : no PND [Joint Pain] : joint pain

## 2022-10-24 NOTE — PHYSICAL EXAM
[Normal Appearance] : normal appearance [Well Groomed] : well groomed [No Deformities] : no deformities [General Appearance - In No Acute Distress] : no acute distress [Normal Conjunctiva] : the conjunctiva exhibited no abnormalities [Eyelids - No Xanthelasma] : the eyelids demonstrated no xanthelasmas [Normal Oral Mucosa] : normal oral mucosa [No Oral Pallor] : no oral pallor [No Oral Cyanosis] : no oral cyanosis [Respiration, Rhythm And Depth] : normal respiratory rhythm and effort [Exaggerated Use Of Accessory Muscles For Inspiration] : no accessory muscle use [Auscultation Breath Sounds / Voice Sounds] : lungs were clear to auscultation bilaterally [Heart Rate And Rhythm] : heart rate and rhythm were normal [Heart Sounds] : normal S1 and S2 [Murmurs] : no murmurs present [Abdomen Soft] : soft [Abdomen Tenderness] : non-tender [Abdomen Mass (___ Cm)] : no abdominal mass palpated [Abnormal Walk] : normal gait [Nail Clubbing] : no clubbing of the fingernails [Cyanosis, Localized] : no localized cyanosis [Petechial Hemorrhages (___cm)] : no petechial hemorrhages [Skin Color & Pigmentation] : normal skin color and pigmentation [No Venous Stasis] : no venous stasis [] : no rash [Skin Lesions] : no skin lesions [No Skin Ulcers] : no skin ulcer [No Xanthoma] : no  xanthoma was observed [FreeTextEntry1] : No venous stasis changes [Oriented To Time, Place, And Person] : oriented to person, place, and time [Affect] : the affect was normal [Mood] : the mood was normal [No Anxiety] : not feeling anxious

## 2022-10-24 NOTE — HISTORY OF PRESENT ILLNESS
[Preoperative Visit] : for a medical evaluation prior to surgery [Scheduled Procedure ___] : a [unfilled] [Date of Surgery ___] : on [unfilled] [Surgeon Name ___] : surgeon: [unfilled] [Good] : Good [Fever] : no fever [Chills] : no chills [Fatigue] : no fatigue [Chest Pain] : no chest pain [Cough] : no cough [Dyspnea] : no dyspnea [Dysuria] : no dysuria [Urinary Frequency] : no urinary frequency [Nausea] : no nausea [Vomiting] : no vomiting [Diarrhea] : no diarrhea [Abdominal Pain] : no abdominal pain [Easy Bruising] : no easy bruising [Lower Extremity Swelling] : lower extremity swelling [Poor Exercise Tolerance] : poor exercise tolerance [Diabetes] : diabetes [Cardiovascular Disease] : cardiovascular disease [Pulmonary Disease] : no pulmonary disease [Anti-Platelet Agents] : no anti-platelet agents [Nicotine Dependence] : no nicotine dependence [Alcohol Use] : no  alcohol use [Renal Disease] : no renal disease [GI Disease] : no gastrointestinal disease [Sleep Apnea] : no sleep apnea [Thromboembolic Problems] : no thromboembolic problems [Frequent use of NSAIDs] : no use of NSAIDs [Transfusion Reaction] : no transfusion reaction [Impaired Immunity] : no impaired immunity [Steroid Use in Last 6 Months] : no steroid use in the last six months [Frequent Aspirin Use] : no frequent aspirin use [Prior Anesthesia] : Prior anesthesia [Prev Anesthesia Reaction] : no previous anesthesia reaction [Electrocardiogram] : ~T an ECG ~C was performed [Echocardiogram] : ~T an echocardiogram ~C was performed [Cardiovascular Stress Test] : a cardiac stress test ~T ~C was performed [Unable to Assess] : Unable to Assess [Anesthesia Reaction] : no anesthesia reaction [Sudden Death] : no sudden death [Clotting Disorder] : no clotting disorder [Bleeding Disorder] : no bleeding disorder [FreeTextEntry1] : March 10, 2016. Long complicated story. Patient doesn't speak much English but is here with her daughter. She has had hypertension for probably 40 years and used to be difficult to control even with labetalol and clonidine. In addition she has had diabetes for about 6 years and hyperlipidemia for about 4 years. She presented to [facility] on March 13, 2015 complaining of 2-3 weeks of dyspnea on exertion and edema. At the time of admission she was in rapid atrial fibrillation and her blood pressure was 220/160. She was treated by Dr. Nicole  initially with Cardizem, followed by a SHAKEEL cardioversion and discharged on sotalol 80 q.12 h. and Xarelto. In addition her regimen was changed from clonidine and labetalol initially to lisinopril but she developed a cough and now she is on losartan//12.5 along with amlodipine 5 mg. On this regimen she has had fairly good blood pressure control.  She was also changed from Xarelto to Eliquis 5 mg b.i.d. About 4 months ago he saw her and she was in atrial fibrillation. It may be that Dr. Nicole in conjunction with her PCP lowered the sotalol to once a day, possibly concerned about asthma or beta blocker side effects. In any case she was in atrial fibrillation again so he changed her to Multaq. He did not see her again until 4 months later and at that point (3/3) she was still in atrial fibrillation and he said to stop the Multaq and gave her a prescription for metoprolol and possibly digoxin. The daughter was somewhat concerned about this and did not fill either prescription or stop the Multaq. At the same time the daughter was having her mother evaluated for weight-reduction surgery and now she is here for my evaluation. She does complain about orthopnea and pedal edema and cannot walk very far without shortness of breath. No exertional chest pain. \par During the hospitalization in March of 2015 she had a coronary angiogram. There was nonobstructive coronary artery disease with a 25% lesion in the left main and 50% lesion in the mid LAD and first diagonal. No significant lesions in the right circumflex. LVEF estimated at 55%. High LVEDP, pulmonary pressures, systemic resistance. Her echocardiogram was suboptimal but she did have a transesophageal echo as well and she was felt to have normal LV systolic function with concentric LVH, the extent of which was not mentioned on either the transthoracic and transesophageal echo.  \par March 18, 2016. Patient returns for followup and echo. She doesn't feel any different off Multaq and on bisoprolol. Heart rate is well controlled in the 70s. Echocardiogram was suboptimal because endocardium could not be well visualized, but grossly normal LV function and only mild left atrial enlargement. No severe valvular disease. RVSP 41 mm of mercury. I had a long with the patient and her daughter who was with her as she is compliant with the Eliquis and, therefore, we're starting flecainide 50 mg q.12 h.\par \par September 19, 2016.  Patient is here for preop evaluation for laparoscopic duodenal switch procedure.  Since March, the patient did not tolerate the Flecainide and did not convert to sinus rhythm and we have elected to just control her rate and leave her on anticoagulation. She has had labile hypertension with severe "white coat" hypertension at times, and other times fairly normal blood pressures on just losartan/HCT, and bisoprolol. She has managed to lose about 13 pounds. She denies exertional chest pain or change in her shortness of breath. Her EKG was within normal limits other than her atrial fibrillation, and I elected to change her by bisoprolol to bystolic 10 mg daily.\par December 19, 2016. Weight reduction surgeons want her to lose more weight before the surgery, but she is unable to. She has no other complaints except her blood pressure remains erratic. Her internist lowered her losartan to 25 mg, probably by error  as she was on Losartan//25. Her blood pressure here was still elevated, and if her labs are within normal limits. I will resume Losartan//25. I also recommended she consider seeing the bariatric surgery doctors at [facility]. We also discussed the pros and cons of Eliquis versus Xarelto as her insurance will only cover the Xarelto.\par July 6, 2017. First visit since December, as I would not refill her medications. She brought with her labs from [facility] with a BUN of 28 and creatinine of 0.9. Potassium 4.6. Cholesterol 160, HDL 50, LDL 91, triglycerides 96, glucose 121, with hemoglobin A1c 6.8, C-reactive protein, negative, TSH 3.2, normal CBC, vitamin D. Positive microalbumin. In addition, she had carotid Dopplers that showed a 45% stenosis right carotid 20-30% right mid ICA. 42% left mid common carotid with 40% left carotid bulb with 46% left proximal ICA 50-70% left mid and distal RCA. Internist was concerned because these #"s are worse than one year ago.\par January 9, 2018. Patient returns in followup. Multiple nonspecific complaints with atypical chest pain, shortness of breath, nothing that fits for exertional angina or for congestive heart failure. Patient has been unable to lose weight and is up another 10-13 pounds. Exam is otherwise unremarkable and the EKG is atrial fibrillation with ventricular rate controlled and otherwise within normal limits. Patient again claims whenever I increased her bystolic, her blood pressure became too low.\par June 27, 2018. Seen by Dr. Lisker. She was seen today for preoperative cardiovascular evaluation prior to her planned operative repair of a right proximal humerus and humerus shaft fracture scheduled on July 2, 2018.\par She is a 68-year-old morbidly obese woman with history of chronic atrial fibrillation and moderate nonobstructive coronary artery disease who slipped and fell and broke her right arm. She reports being in significant pain despite pain medication use including Percocet and Toradol.\par She has been on and off Eliquis over the past week, as she thought that her surgery would be sooner. She is also taking her blood pressure medications intermittently because of low blood pressure readings that she treated to pain treatment with narcotics.\par January 28, 2020.  First visit in a year and a half.  According to the daughter at one point she had lost 70 pounds (down to 247) and her blood pressure was much better controlled but since then she has put back some of the weight down for other reasons that are unclear her blood pressure seems to be high again.  She had been on Bystolic and had been taking as much as 2 or 3 times a day.  Still has some problems with her hand and shoulder after her surgery.  No other change in medication.  Remains in atrial fibrillation with ventricular rate of 89 and otherwise normal ECG.  Using a thigh blood pressure cuff on her right arm her blood pressure was not quite so alarmingly high (168/79). \par January 29, 2020.  Hemoglobin A1c 6.3. Good lipid profile although would like LDL below 70 instead of just 83. High BNP probably mostly from atrial fibrillation. Add amlodipine 5 mg and follow-up in 2 to 3 months. \par \par October 20, 2022.  First visit in over 2-1/2 years.  She never did have the weight reduction surgery.  She has been followed by primary care and we reviewed all her medications which seem to be unchanged.  Issue now is that she needs knee replacement on the right; she had one on the left many years ago.  She needs cardiac evaluation and clearance.  No recent echo or stress test.  Her ambulation is limited and she uses a cane and cannot walk on a treadmill.  She remains in atrial fibrillation with good rate control and the rest of her ECG is actually unremarkable and unchanged from 2-1/2 years ago.  Ventricular rate is 89.  (At the end the daughter asked about ablation if that would be a consideration and I told her given how long her mother has been in atrial fibrillation the chance of success is extremely low.)  At the end of the exam I explained to the patient and her daughter that she needs a nuclear pharmacologic stress test to be enable her to clear her for surgery given her shortness of breath, limited mobility now, and her risk factors for coronary artery disease.  An echocardiogram should be done to follow-up on her valve disease and get another assessment of LV function.\par October 21, 2022. Patient return for echocardiogram.  MAC with mild MR.  Aortic valve not well visualized but no gradient and only mild AI.  Ascending aorta 3.9 cm.  Mild LAE.  LV endocardium not that well visualized due to body habitus but overall grossly normal systolic function with no wall motion abnormalities obvious.  LVEF around 60%.  Concentric remodeling.  Indeterminate diastolic function.  Normal RV size and function with mild to moderate TR.  RVSP 44.  Normal pericardium with no effusion. \par October 24, 2022.  Patient return for pharmacologic nuclear study.Nuclear scan has small areas that are probably just breast attenuation artifact. Medium size moderate defect in the inferior, inferoseptal, inferoapical walls that are consistent with infarct but no evidence of ischemia and overall LVEF is greater than 70% with some hypokinesis in the inferior inferoseptal and inferoapical walls. \par \par

## 2022-10-24 NOTE — DISCUSSION/SUMMARY
[EKG obtained to assist in diagnosis and management of assessed problem(s)] : EKG obtained to assist in diagnosis and management of assessed problem(s) [Procedure Intermediate Risk] : the procedure risk is intermediate [Patient Intermediate Risk] : the patient is an intermediate risk [Additional Diagnostics Recommended] : additional diagnostics recommended [As per surgery] : as per surgery [Continue] : Continue medications as currently directed [FreeTextEntry1] : Echo and pharmacologic nuclear study done. [FreeTextEntry3] : Hold Eliquis 2 days prior to surgery as well as the morning of surgery.

## 2022-10-24 NOTE — HISTORY OF PRESENT ILLNESS
[Preoperative Visit] : for a medical evaluation prior to surgery [Scheduled Procedure ___] : a [unfilled] [Date of Surgery ___] : on [unfilled] [Surgeon Name ___] : surgeon: [unfilled] [Good] : Good [Fever] : no fever [Chills] : no chills [Fatigue] : no fatigue [Chest Pain] : no chest pain [Cough] : no cough [Dyspnea] : no dyspnea [Dysuria] : no dysuria [Urinary Frequency] : no urinary frequency [Nausea] : no nausea [Vomiting] : no vomiting [Diarrhea] : no diarrhea [Abdominal Pain] : no abdominal pain [Easy Bruising] : no easy bruising [Lower Extremity Swelling] : lower extremity swelling [Poor Exercise Tolerance] : poor exercise tolerance [Diabetes] : diabetes [Cardiovascular Disease] : cardiovascular disease [Pulmonary Disease] : no pulmonary disease [Anti-Platelet Agents] : no anti-platelet agents [Nicotine Dependence] : no nicotine dependence [Alcohol Use] : no  alcohol use [Renal Disease] : no renal disease [GI Disease] : no gastrointestinal disease [Sleep Apnea] : no sleep apnea [Thromboembolic Problems] : no thromboembolic problems [Frequent use of NSAIDs] : no use of NSAIDs [Transfusion Reaction] : no transfusion reaction [Impaired Immunity] : no impaired immunity [Steroid Use in Last 6 Months] : no steroid use in the last six months [Frequent Aspirin Use] : no frequent aspirin use [Prior Anesthesia] : Prior anesthesia [Prev Anesthesia Reaction] : no previous anesthesia reaction [Electrocardiogram] : ~T an ECG ~C was performed [Echocardiogram] : ~T an echocardiogram ~C was performed [Cardiovascular Stress Test] : a cardiac stress test ~T ~C was performed [Unable to Assess] : Unable to Assess [Anesthesia Reaction] : no anesthesia reaction [Sudden Death] : no sudden death [Clotting Disorder] : no clotting disorder [Bleeding Disorder] : no bleeding disorder [FreeTextEntry1] : March 10, 2016. Long complicated story. Patient doesn't speak much English but is here with her daughter. She has had hypertension for probably 40 years and used to be difficult to control even with labetalol and clonidine. In addition she has had diabetes for about 6 years and hyperlipidemia for about 4 years. She presented to [facility] on March 13, 2015 complaining of 2-3 weeks of dyspnea on exertion and edema. At the time of admission she was in rapid atrial fibrillation and her blood pressure was 220/160. She was treated by Dr. Nicole  initially with Cardizem, followed by a SHAKEEL cardioversion and discharged on sotalol 80 q.12 h. and Xarelto. In addition her regimen was changed from clonidine and labetalol initially to lisinopril but she developed a cough and now she is on losartan//12.5 along with amlodipine 5 mg. On this regimen she has had fairly good blood pressure control.  She was also changed from Xarelto to Eliquis 5 mg b.i.d. About 4 months ago he saw her and she was in atrial fibrillation. It may be that Dr. Nicole in conjunction with her PCP lowered the sotalol to once a day, possibly concerned about asthma or beta blocker side effects. In any case she was in atrial fibrillation again so he changed her to Multaq. He did not see her again until 4 months later and at that point (3/3) she was still in atrial fibrillation and he said to stop the Multaq and gave her a prescription for metoprolol and possibly digoxin. The daughter was somewhat concerned about this and did not fill either prescription or stop the Multaq. At the same time the daughter was having her mother evaluated for weight-reduction surgery and now she is here for my evaluation. She does complain about orthopnea and pedal edema and cannot walk very far without shortness of breath. No exertional chest pain. \par During the hospitalization in March of 2015 she had a coronary angiogram. There was nonobstructive coronary artery disease with a 25% lesion in the left main and 50% lesion in the mid LAD and first diagonal. No significant lesions in the right circumflex. LVEF estimated at 55%. High LVEDP, pulmonary pressures, systemic resistance. Her echocardiogram was suboptimal but she did have a transesophageal echo as well and she was felt to have normal LV systolic function with concentric LVH, the extent of which was not mentioned on either the transthoracic and transesophageal echo.  \par March 18, 2016. Patient returns for followup and echo. She doesn't feel any different off Multaq and on bisoprolol. Heart rate is well controlled in the 70s. Echocardiogram was suboptimal because endocardium could not be well visualized, but grossly normal LV function and only mild left atrial enlargement. No severe valvular disease. RVSP 41 mm of mercury. I had a long with the patient and her daughter who was with her as she is compliant with the Eliquis and, therefore, we're starting flecainide 50 mg q.12 h.\par \par September 19, 2016.  Patient is here for preop evaluation for laparoscopic duodenal switch procedure.  Since March, the patient did not tolerate the Flecainide and did not convert to sinus rhythm and we have elected to just control her rate and leave her on anticoagulation. She has had labile hypertension with severe "white coat" hypertension at times, and other times fairly normal blood pressures on just losartan/HCT, and bisoprolol. She has managed to lose about 13 pounds. She denies exertional chest pain or change in her shortness of breath. Her EKG was within normal limits other than her atrial fibrillation, and I elected to change her by bisoprolol to bystolic 10 mg daily.\par December 19, 2016. Weight reduction surgeons want her to lose more weight before the surgery, but she is unable to. She has no other complaints except her blood pressure remains erratic. Her internist lowered her losartan to 25 mg, probably by error  as she was on Losartan//25. Her blood pressure here was still elevated, and if her labs are within normal limits. I will resume Losartan//25. I also recommended she consider seeing the bariatric surgery doctors at [facility]. We also discussed the pros and cons of Eliquis versus Xarelto as her insurance will only cover the Xarelto.\par July 6, 2017. First visit since December, as I would not refill her medications. She brought with her labs from [facility] with a BUN of 28 and creatinine of 0.9. Potassium 4.6. Cholesterol 160, HDL 50, LDL 91, triglycerides 96, glucose 121, with hemoglobin A1c 6.8, C-reactive protein, negative, TSH 3.2, normal CBC, vitamin D. Positive microalbumin. In addition, she had carotid Dopplers that showed a 45% stenosis right carotid 20-30% right mid ICA. 42% left mid common carotid with 40% left carotid bulb with 46% left proximal ICA 50-70% left mid and distal RCA. Internist was concerned because these #"s are worse than one year ago.\par January 9, 2018. Patient returns in followup. Multiple nonspecific complaints with atypical chest pain, shortness of breath, nothing that fits for exertional angina or for congestive heart failure. Patient has been unable to lose weight and is up another 10-13 pounds. Exam is otherwise unremarkable and the EKG is atrial fibrillation with ventricular rate controlled and otherwise within normal limits. Patient again claims whenever I increased her bystolic, her blood pressure became too low.\par June 27, 2018. Seen by Dr. Lisker. She was seen today for preoperative cardiovascular evaluation prior to her planned operative repair of a right proximal humerus and humerus shaft fracture scheduled on July 2, 2018.\par She is a 68-year-old morbidly obese woman with history of chronic atrial fibrillation and moderate nonobstructive coronary artery disease who slipped and fell and broke her right arm. She reports being in significant pain despite pain medication use including Percocet and Toradol.\par She has been on and off Eliquis over the past week, as she thought that her surgery would be sooner. She is also taking her blood pressure medications intermittently because of low blood pressure readings that she treated to pain treatment with narcotics.\par January 28, 2020.  First visit in a year and a half.  According to the daughter at one point she had lost 70 pounds (down to 247) and her blood pressure was much better controlled but since then she has put back some of the weight down for other reasons that are unclear her blood pressure seems to be high again.  She had been on Bystolic and had been taking as much as 2 or 3 times a day.  Still has some problems with her hand and shoulder after her surgery.  No other change in medication.  Remains in atrial fibrillation with ventricular rate of 89 and otherwise normal ECG.  Using a thigh blood pressure cuff on her right arm her blood pressure was not quite so alarmingly high (168/79). \par January 29, 2020.  Hemoglobin A1c 6.3. Good lipid profile although would like LDL below 70 instead of just 83. High BNP probably mostly from atrial fibrillation. Add amlodipine 5 mg and follow-up in 2 to 3 months. \par \par October 20, 2022.  First visit in over 2-1/2 years.  She never did have the weight reduction surgery.  She has been followed by primary care and we reviewed all her medications which seem to be unchanged.  Issue now is that she needs knee replacement on the right; she had one on the left many years ago.  She needs cardiac evaluation and clearance.  No recent echo or stress test.  Her ambulation is limited and she uses a cane and cannot walk on a treadmill.  She remains in atrial fibrillation with good rate control and the rest of her ECG is actually unremarkable and unchanged from 2-1/2 years ago.  Ventricular rate is 89.  (At the end the daughter asked about ablation if that would be a consideration and I told her given how long her mother has been in atrial fibrillation the chance of success is extremely low.)  At the end of the exam I explained to the patient and her daughter that she needs a nuclear pharmacologic stress test to be enable her to clear her for surgery given her shortness of breath, limited mobility now, and her risk factors for coronary artery disease.  An echocardiogram should be done to follow-up on her valve disease and get another assessment of LV function.\par October 21, 2022. Patient return for echocardiogram.  MAC with mild MR.  Aortic valve not well visualized but no gradient and only mild AI.  Ascending aorta 3.9 cm.  Mild LAE.  LV endocardium not that well visualized due to body habitus but overall grossly normal systolic function with no wall motion abnormalities obvious.  LVEF around 60%.  Concentric remodeling.  Indeterminate diastolic function.  Normal RV size and function with mild to moderate TR.  RVSP 44.  Normal pericardium with no effusion. \par October 24, 2022.  Patient return for pharmacologic nuclear study.Nuclear scan has small areas that are probably just breast attenuation artifact. Medium size moderate defect in the inferior, inferoseptal, inferoapical walls that are consistent with infarct but no evidence of ischemia and overall LVEF is greater than 70% with some hypokinesis in the inferior inferoseptal and inferoapical walls. \par \par

## 2022-10-24 NOTE — REASON FOR VISIT
78
[FreeTextEntry1] : Followup for a woman with obesity, awaiting weight reduction surgery (canceled 3 times), along with atrial fibrillation, Hypertension, and nonobstructive coronary artery disease. s/p right arm surgery.\par Here after 2-1/2 years because she needs cardiac evaluation and clearance before knee replacement surgery.
[FreeTextEntry1] : Followup for a woman with obesity, awaiting weight reduction surgery (canceled 3 times), along with atrial fibrillation, Hypertension, and nonobstructive coronary artery disease. s/p right arm surgery.\par Here after 2-1/2 years because she needs cardiac evaluation and clearance before knee replacement surgery.

## 2024-01-01 NOTE — ED PROVIDER NOTE - CONDITION AT DISCHARGE:
Patient afebrile this shift. Voids and stools. Bonding well with both mother and father; both respond to infant cues and participate in infant care. Feeding without difficulty. Vital signs stable at this time.       Problem: Infant Inpatient Plan of Care  Goal: Plan of Care Review  Outcome: Ongoing, Progressing  Goal: Patient-Specific Goal (Individualized)  Outcome: Ongoing, Progressing  Goal: Absence of Hospital-Acquired Illness or Injury  Outcome: Ongoing, Progressing  Goal: Optimal Comfort and Wellbeing  Outcome: Ongoing, Progressing  Goal: Readiness for Transition of Care  Outcome: Ongoing, Progressing      Improved

## 2024-10-08 ENCOUNTER — INPATIENT (INPATIENT)
Facility: HOSPITAL | Age: 74
LOS: 2 days | Discharge: HOME | DRG: 293 | End: 2024-10-11
Attending: INTERNAL MEDICINE | Admitting: INTERNAL MEDICINE
Payer: MEDICARE

## 2024-10-08 VITALS
TEMPERATURE: 98 F | WEIGHT: 279.99 LBS | OXYGEN SATURATION: 100 % | SYSTOLIC BLOOD PRESSURE: 112 MMHG | HEART RATE: 60 BPM | RESPIRATION RATE: 27 BRPM | DIASTOLIC BLOOD PRESSURE: 76 MMHG | HEIGHT: 67 IN

## 2024-10-08 DIAGNOSIS — J96.01 ACUTE RESPIRATORY FAILURE WITH HYPOXIA: ICD-10-CM

## 2024-10-08 DIAGNOSIS — I10 ESSENTIAL (PRIMARY) HYPERTENSION: ICD-10-CM

## 2024-10-08 DIAGNOSIS — E78.5 HYPERLIPIDEMIA, UNSPECIFIED: ICD-10-CM

## 2024-10-08 DIAGNOSIS — I50.9 HEART FAILURE, UNSPECIFIED: ICD-10-CM

## 2024-10-08 DIAGNOSIS — E11.9 TYPE 2 DIABETES MELLITUS WITHOUT COMPLICATIONS: ICD-10-CM

## 2024-10-08 DIAGNOSIS — Z29.9 ENCOUNTER FOR PROPHYLACTIC MEASURES, UNSPECIFIED: ICD-10-CM

## 2024-10-08 DIAGNOSIS — I48.91 UNSPECIFIED ATRIAL FIBRILLATION: ICD-10-CM

## 2024-10-08 DIAGNOSIS — Z96.651 PRESENCE OF RIGHT ARTIFICIAL KNEE JOINT: Chronic | ICD-10-CM

## 2024-10-08 LAB
ALBUMIN SERPL ELPH-MCNC: 3.5 G/DL — SIGNIFICANT CHANGE UP (ref 3.5–5)
ALP SERPL-CCNC: 72 U/L — SIGNIFICANT CHANGE UP (ref 40–120)
ALT FLD-CCNC: 22 U/L DA — SIGNIFICANT CHANGE UP (ref 10–60)
ANION GAP SERPL CALC-SCNC: 5 MMOL/L — SIGNIFICANT CHANGE UP (ref 5–17)
AST SERPL-CCNC: 19 U/L — SIGNIFICANT CHANGE UP (ref 10–40)
BASE EXCESS BLDV CALC-SCNC: 1.1 MMOL/L — SIGNIFICANT CHANGE UP
BASOPHILS # BLD AUTO: 0.06 K/UL — SIGNIFICANT CHANGE UP (ref 0–0.2)
BASOPHILS NFR BLD AUTO: 0.7 % — SIGNIFICANT CHANGE UP (ref 0–2)
BILIRUB SERPL-MCNC: 0.6 MG/DL — SIGNIFICANT CHANGE UP (ref 0.2–1.2)
BUN SERPL-MCNC: 27 MG/DL — HIGH (ref 7–18)
CALCIUM SERPL-MCNC: 9.9 MG/DL — SIGNIFICANT CHANGE UP (ref 8.4–10.5)
CHLORIDE SERPL-SCNC: 111 MMOL/L — HIGH (ref 96–108)
CO2 SERPL-SCNC: 26 MMOL/L — SIGNIFICANT CHANGE UP (ref 22–31)
CREAT SERPL-MCNC: 1.02 MG/DL — SIGNIFICANT CHANGE UP (ref 0.5–1.3)
EGFR: 58 ML/MIN/1.73M2 — LOW
EOSINOPHIL # BLD AUTO: 0.04 K/UL — SIGNIFICANT CHANGE UP (ref 0–0.5)
EOSINOPHIL NFR BLD AUTO: 0.4 % — SIGNIFICANT CHANGE UP (ref 0–6)
GLUCOSE BLDC GLUCOMTR-MCNC: 91 MG/DL — SIGNIFICANT CHANGE UP (ref 70–99)
GLUCOSE SERPL-MCNC: 124 MG/DL — HIGH (ref 70–99)
HCO3 BLDV-SCNC: 28 MMOL/L — SIGNIFICANT CHANGE UP (ref 22–29)
HCT VFR BLD CALC: 38.1 % — SIGNIFICANT CHANGE UP (ref 34.5–45)
HGB BLD-MCNC: 12 G/DL — SIGNIFICANT CHANGE UP (ref 11.5–15.5)
HIV 1 & 2 AB SERPL IA.RAPID: SIGNIFICANT CHANGE UP
IMM GRANULOCYTES NFR BLD AUTO: 0.3 % — SIGNIFICANT CHANGE UP (ref 0–0.9)
LIDOCAIN IGE QN: 15 U/L — SIGNIFICANT CHANGE UP (ref 13–75)
LYMPHOCYTES # BLD AUTO: 0.85 K/UL — LOW (ref 1–3.3)
LYMPHOCYTES # BLD AUTO: 9.4 % — LOW (ref 13–44)
MAGNESIUM SERPL-MCNC: 1.8 MG/DL — SIGNIFICANT CHANGE UP (ref 1.6–2.6)
MCHC RBC-ENTMCNC: 29.9 PG — SIGNIFICANT CHANGE UP (ref 27–34)
MCHC RBC-ENTMCNC: 31.5 GM/DL — LOW (ref 32–36)
MCV RBC AUTO: 94.8 FL — SIGNIFICANT CHANGE UP (ref 80–100)
MONOCYTES # BLD AUTO: 0.49 K/UL — SIGNIFICANT CHANGE UP (ref 0–0.9)
MONOCYTES NFR BLD AUTO: 5.4 % — SIGNIFICANT CHANGE UP (ref 2–14)
NEUTROPHILS # BLD AUTO: 7.62 K/UL — HIGH (ref 1.8–7.4)
NEUTROPHILS NFR BLD AUTO: 83.8 % — HIGH (ref 43–77)
NRBC # BLD: 0 /100 WBCS — SIGNIFICANT CHANGE UP (ref 0–0)
NT-PROBNP SERPL-SCNC: 1680 PG/ML — HIGH (ref 0–125)
PCO2 BLDV: 55 MMHG — HIGH (ref 39–42)
PH BLDV: 7.32 — SIGNIFICANT CHANGE UP (ref 7.32–7.43)
PLATELET # BLD AUTO: 219 K/UL — SIGNIFICANT CHANGE UP (ref 150–400)
PO2 BLDV: 45 MMHG — SIGNIFICANT CHANGE UP
POTASSIUM SERPL-MCNC: 4.4 MMOL/L — SIGNIFICANT CHANGE UP (ref 3.5–5.3)
POTASSIUM SERPL-SCNC: 4.4 MMOL/L — SIGNIFICANT CHANGE UP (ref 3.5–5.3)
PROT SERPL-MCNC: 7.3 G/DL — SIGNIFICANT CHANGE UP (ref 6–8.3)
RBC # BLD: 4.02 M/UL — SIGNIFICANT CHANGE UP (ref 3.8–5.2)
RBC # FLD: 13.2 % — SIGNIFICANT CHANGE UP (ref 10.3–14.5)
SAO2 % BLDV: 73.9 % — SIGNIFICANT CHANGE UP
SODIUM SERPL-SCNC: 142 MMOL/L — SIGNIFICANT CHANGE UP (ref 135–145)
TROPONIN I, HIGH SENSITIVITY RESULT: 74.7 NG/L — HIGH
WBC # BLD: 9.09 K/UL — SIGNIFICANT CHANGE UP (ref 3.8–10.5)
WBC # FLD AUTO: 9.09 K/UL — SIGNIFICANT CHANGE UP (ref 3.8–10.5)

## 2024-10-08 RX ORDER — IPRATROPIUM BROMIDE AND ALBUTEROL SULFATE .5; 3 MG/3ML; MG/3ML
3 SOLUTION RESPIRATORY (INHALATION) ONCE
Refills: 0 | Status: COMPLETED | OUTPATIENT
Start: 2024-10-08 | End: 2024-10-08

## 2024-10-08 RX ORDER — DULOXETINE HCL 20 MG
30 CAPSULE,DELAYED RELEASE (ENTERIC COATED) ORAL DAILY
Refills: 0 | Status: DISCONTINUED | OUTPATIENT
Start: 2024-10-08 | End: 2024-10-11

## 2024-10-08 RX ORDER — DONEPEZIL HCL 10 MG
1 TABLET ORAL
Refills: 0 | DISCHARGE

## 2024-10-08 RX ORDER — FLUTICASONE PROPION/SALMETEROL 100-50 MCG
1 BLISTER, WITH INHALATION DEVICE INHALATION
Refills: 0 | Status: DISCONTINUED | OUTPATIENT
Start: 2024-10-08 | End: 2024-10-11

## 2024-10-08 RX ORDER — GLUCAGON INJECTION, SOLUTION 0.5 MG/.1ML
1 INJECTION, SOLUTION SUBCUTANEOUS ONCE
Refills: 0 | Status: DISCONTINUED | OUTPATIENT
Start: 2024-10-08 | End: 2024-10-11

## 2024-10-08 RX ORDER — DULOXETINE HCL 20 MG
1 CAPSULE,DELAYED RELEASE (ENTERIC COATED) ORAL
Refills: 0 | DISCHARGE

## 2024-10-08 RX ORDER — APIXABAN 5 MG/1
5 TABLET, FILM COATED ORAL EVERY 12 HOURS
Refills: 0 | Status: DISCONTINUED | OUTPATIENT
Start: 2024-10-08 | End: 2024-10-11

## 2024-10-08 RX ORDER — ALCOHOL ANTISEPTIC PADS
15 PADS, MEDICATED (EA) TOPICAL ONCE
Refills: 0 | Status: DISCONTINUED | OUTPATIENT
Start: 2024-10-08 | End: 2024-10-11

## 2024-10-08 RX ORDER — PANTOPRAZOLE SODIUM 40 MG/1
40 TABLET, DELAYED RELEASE ORAL
Refills: 0 | Status: DISCONTINUED | OUTPATIENT
Start: 2024-10-08 | End: 2024-10-11

## 2024-10-08 RX ORDER — ACETAMINOPHEN 325 MG
650 TABLET ORAL EVERY 6 HOURS
Refills: 0 | Status: DISCONTINUED | OUTPATIENT
Start: 2024-10-08 | End: 2024-10-11

## 2024-10-08 RX ORDER — MAG HYDROX/ALUMINUM HYD/SIMETH 200-200-20
30 SUSPENSION, ORAL (FINAL DOSE FORM) ORAL EVERY 4 HOURS
Refills: 0 | Status: DISCONTINUED | OUTPATIENT
Start: 2024-10-08 | End: 2024-10-11

## 2024-10-08 RX ORDER — INSULIN LISPRO 100/ML
VIAL (ML) SUBCUTANEOUS
Refills: 0 | Status: DISCONTINUED | OUTPATIENT
Start: 2024-10-08 | End: 2024-10-11

## 2024-10-08 RX ORDER — DONEPEZIL HCL 10 MG
5 TABLET ORAL AT BEDTIME
Refills: 0 | Status: DISCONTINUED | OUTPATIENT
Start: 2024-10-08 | End: 2024-10-11

## 2024-10-08 RX ORDER — PIOGLITAZONE HYDROCHLORIDE 15 MG/1
1 TABLET ORAL
Refills: 0 | DISCHARGE

## 2024-10-08 RX ORDER — SODIUM CHLORIDE IRRIG SOLUTION 0.9 %
1000 SOLUTION, IRRIGATION IRRIGATION
Refills: 0 | Status: DISCONTINUED | OUTPATIENT
Start: 2024-10-08 | End: 2024-10-11

## 2024-10-08 RX ORDER — 5-HYDROXYTRYPTOPHAN (5-HTP) 100 MG
3 TABLET,DISINTEGRATING ORAL AT BEDTIME
Refills: 0 | Status: DISCONTINUED | OUTPATIENT
Start: 2024-10-08 | End: 2024-10-11

## 2024-10-08 RX ORDER — QUETIAPINE FUMARATE 50 MG/1
1 TABLET, FILM COATED ORAL
Refills: 0 | DISCHARGE

## 2024-10-08 RX ORDER — DEXLANSOPRAZOLE 60 MG
1 CAPSULE, DELAYED RELEASE, BIPHASIC ORAL
Refills: 0 | DISCHARGE

## 2024-10-08 RX ORDER — QUETIAPINE FUMARATE 50 MG/1
25 TABLET, FILM COATED ORAL AT BEDTIME
Refills: 0 | Status: DISCONTINUED | OUTPATIENT
Start: 2024-10-08 | End: 2024-10-11

## 2024-10-08 RX ORDER — BUDESONIDE AND FORMOTEROL FUMARATE DIHYDRATE 80; 4.5 UG/1; UG/1
2 AEROSOL RESPIRATORY (INHALATION)
Refills: 0 | DISCHARGE

## 2024-10-08 RX ORDER — TIRZEPATIDE 12.5 MG/.5ML
15 INJECTION, SOLUTION SUBCUTANEOUS
Refills: 0 | DISCHARGE

## 2024-10-08 RX ORDER — IPRATROPIUM BROMIDE 42 UG/1
2 SPRAY, METERED NASAL
Refills: 0 | DISCHARGE

## 2024-10-08 RX ORDER — FUROSEMIDE 10 MG/ML
40 INJECTION INTRAVENOUS ONCE
Refills: 0 | Status: COMPLETED | OUTPATIENT
Start: 2024-10-08 | End: 2024-10-08

## 2024-10-08 RX ORDER — ONDANSETRON HCL/PF 4 MG/2 ML
4 VIAL (ML) INJECTION EVERY 8 HOURS
Refills: 0 | Status: DISCONTINUED | OUTPATIENT
Start: 2024-10-08 | End: 2024-10-11

## 2024-10-08 RX ORDER — AMLODIPINE BESYLATE 5 MG
1 TABLET ORAL
Refills: 0 | DISCHARGE

## 2024-10-08 RX ORDER — ERGOCALCIFEROL 1.25 MG/1
1 CAPSULE ORAL
Refills: 0 | DISCHARGE

## 2024-10-08 RX ORDER — ALCOHOL ANTISEPTIC PADS
25 PADS, MEDICATED (EA) TOPICAL ONCE
Refills: 0 | Status: DISCONTINUED | OUTPATIENT
Start: 2024-10-08 | End: 2024-10-11

## 2024-10-08 RX ORDER — AMLODIPINE BESYLATE 5 MG
10 TABLET ORAL DAILY
Refills: 0 | Status: DISCONTINUED | OUTPATIENT
Start: 2024-10-08 | End: 2024-10-11

## 2024-10-08 RX ORDER — MELOXICAM 7.5 MG/1
1 TABLET ORAL
Refills: 0 | DISCHARGE

## 2024-10-08 RX ORDER — ROSUVASTATIN CALCIUM 20 MG/1
20 TABLET, COATED ORAL AT BEDTIME
Refills: 0 | Status: DISCONTINUED | OUTPATIENT
Start: 2024-10-08 | End: 2024-10-11

## 2024-10-08 RX ORDER — NEBIVOLOL 2.5 MG/1
1 TABLET ORAL
Refills: 0 | DISCHARGE

## 2024-10-08 RX ADMIN — QUETIAPINE FUMARATE 25 MILLIGRAM(S): 50 TABLET, FILM COATED ORAL at 22:56

## 2024-10-08 RX ADMIN — Medication 30 MILLIGRAM(S): at 22:55

## 2024-10-08 RX ADMIN — Medication 650 MILLIGRAM(S): at 23:55

## 2024-10-08 RX ADMIN — ROSUVASTATIN CALCIUM 20 MILLIGRAM(S): 20 TABLET, COATED ORAL at 22:55

## 2024-10-08 RX ADMIN — Medication 650 MILLIGRAM(S): at 23:51

## 2024-10-08 RX ADMIN — IPRATROPIUM BROMIDE AND ALBUTEROL SULFATE 3 MILLILITER(S): .5; 3 SOLUTION RESPIRATORY (INHALATION) at 13:59

## 2024-10-08 RX ADMIN — FUROSEMIDE 40 MILLIGRAM(S): 10 INJECTION INTRAVENOUS at 16:55

## 2024-10-08 RX ADMIN — Medication 3 MILLIGRAM(S): at 22:55

## 2024-10-08 RX ADMIN — Medication 1 DOSE(S): at 22:57

## 2024-10-08 NOTE — H&P ADULT - HISTORY OF PRESENT ILLNESS
Patient is a 75 y/o F with a PmHx of HTN, DM, HLD, asthma, afib (dx 8 years ago), who presents to the ED with a 5 day hx of worsening SOB, orthopnea and lower extremity swelling. Patient notes she started to feel SOB 5 days ago and went to an outpatient clinic, who prescribed her inhalers. The inhalers did not help, and an SPO2 reading of 60% this morning prompted family to call EMS and bring patient to the ED. Currently feels less SOB with O2 NC 4L. Patient reports she had a similar episode of SOB 8 years ago, and was diagnosed with fluid overload and afib at that hospitalization. Denies any CP, cough, fevers, sick contacts.     In ED    EKG: Afib  Trop 74.7, BNP 1680  CXR: B/L effusions  S/p 40mg IV lasix

## 2024-10-08 NOTE — H&P ADULT - NSHPPHYSICALEXAM_GEN_ALL_CORE
PHYSICAL EXAM:  GENERAL: NAD, speaks in full sentences, no signs of respiratory distress  HEAD:  Atraumatic, Normocephalic  EYES: EOMI, PERRLA, conjunctiva and sclera clear  NECK: Supple, No JVD  CHEST/LUNG: Clear to auscultation bilaterally; No wheeze; No crackles; No accessory muscles used  HEART: Regular rate and rhythm; No murmurs;   ABDOMEN: Soft, Nontender, Nondistended; Bowel sounds present; No guarding  EXTREMITIES:  2+ Peripheral Pulses, No cyanosis or edema  PSYCH: AAOx3  NEUROLOGY: non-focal  SKIN: No rashes or lesions PHYSICAL EXAM:  GENERAL: NAD, resting comfortably on 4L NC  HEAD:  Atraumatic, Normocephalic  EYES: EOMI, PERRLA, conjunctiva and sclera clear  CHEST/LUNG: Crackles at bases b/l, diminished breath sounds b/l  HEART: Irregular rhythm. No murmurs;   ABDOMEN: Soft, Nontender, Nondistended; Bowel sounds present; No guarding  EXTREMITIES:  4+ pitting edema 2+ Peripheral Pulses, No cyanosis  PSYCH: AAOx3  NEUROLOGY: non-focal  SKIN: No rashes or lesions

## 2024-10-08 NOTE — H&P ADULT - PROBLEM SELECTOR PLAN 6
h/o DM on pioglitazone, miguel,    will  hold oral dm meds while inpatient   f/u A1c  low corrective sliding scale  Adjust insulin as indicated

## 2024-10-08 NOTE — ED PROVIDER NOTE - PHYSICAL EXAMINATION
General: uncomfortable appearing, moderate distress   HEENT: normocephalic, atraumatic   Respiratory: increased work of breathing, lungs clear to auscultation bilaterally   Cardiac: irregularly irregular   Abdomen: soft, non-tender, no guarding or rebound   MSK: no swelling or tenderness of lower extremities, moving all extremities spontaneously   Skin: warm, dry   Neuro: A&Ox3  Psych: appropriate affect

## 2024-10-08 NOTE — ED ADULT TRIAGE NOTE - MEANS OF ARRIVAL
Patient notified of cfDNA results low risk.Patient notified of gender per request. cfDNA report sent to OB office.    stretcher

## 2024-10-08 NOTE — ED ADULT TRIAGE NOTE - CHIEF COMPLAINT QUOTE
difficulty breathing x 3 days and got worsen today, As per EMS, saturation 70%RA, at triage, pt on 15L non rebreather 100% saturation.

## 2024-10-08 NOTE — ED ADULT NURSE NOTE - CHIEF COMPLAINT QUOTE
Unknown if ever smoked
difficulty breathing x 3 days and got worsen today, As per EMS, saturation 70%RA, at triage, pt on 15L non rebreather 100% saturation.

## 2024-10-08 NOTE — PATIENT PROFILE ADULT - FALL HARM RISK - RISK INTERVENTIONS

## 2024-10-08 NOTE — H&P ADULT - ASSESSMENT
Patient is a 75 y/o F with a PmHx of HTN, DM, HLD, asthma, afib (dx 8 years ago), with a 5 day hx of worsening SOB and lower extremity swelling. S/p 40mg IV lasix in ED, CXR with b/l effusions. Patient likely fluid overloaded 2/2 to  Patient is a 73 y/o F with a PmHx of HTN, DM, HLD, asthma, afib (dx 8 years ago), with a 5 day hx of worsening SOB and lower extremity swelling. S/p 40mg IV lasix in ED, CXR with b/l effusions. Patient being admitted for AHRF 2/2 volume overload. Patient is a 73 y/o F with a PmHx of HTN, DM, HLD, asthma, afib (dx 8 years ago), with a 5 day hx of worsening SOB and lower extremity swelling. S/p 40mg IV lasix in ED, CXR with b/l effusions. Patient being admitted for AHRF 2/2 volume overload, likely 2/2 CHF exacerbation

## 2024-10-08 NOTE — ED PROVIDER NOTE - OBJECTIVE STATEMENT
74-year-old female, PMH of DM, HTN, A-fib (on Eliquis), CHF, HLD, presenting with shortness of breath.  History obtained by daughter at bedside.  Daughter reports for the past week the patient has had increasing difficulty breathing.  Patient has also been coughing.  No fever or chest pain.  Daughter reports at home the patient's oxygen level was in the mid 80s last night and then today when they called 911 the patient's oxygen level was in the 60s.

## 2024-10-08 NOTE — ED ADULT NURSE NOTE - OBJECTIVE STATEMENT
Pt presents to the ED c/o SOB x 3 days worsening this morning. Pt endorses history of asthma. Pt denies any fevers, chest pain, dizziness, and N/V. Pt sitting up right, speaking in full sentences, no distress noted.

## 2024-10-08 NOTE — H&P ADULT - PROBLEM SELECTOR PLAN 1
Presenting to ED on p/w sob + LE edema x5 days  CXR shows b/l effusions  EKG showing afib  BNP 1680   takes bystolic, amlodipine at home    -c/w home meds  -S/p IV lasix 40mg in ED. Continue IV lasix  -f/u echo  -Remote tele  -Troponin 74.7  -daily weights, strict I&O  -Cardio Consulted: Dr. Mcknight p/w sob + LE edema x5 days  CXR shows b/l effusions  EKG showing afib  BNP 1680   takes bystolic, amlodipine at home    -c/w home meds  -S/p IV lasix 40mg in ED. monitor and consider continuing IV lasix in AM   -f/u echo  -Remote tele  -Troponin 74.7  -daily weights, strict I&O  -Cardio Consulted: Dr. Mcknight

## 2024-10-08 NOTE — H&P ADULT - PROBLEM SELECTOR PLAN 5
- pt takes rosuvastatin 20 mg at home  - c/w atorvastatin 40 mg interchange  - f/u lipid profile  - Dash Diet

## 2024-10-08 NOTE — H&P ADULT - NSHPREVIEWOFSYSTEMS_GEN_ALL_CORE
- CONSTITUTIONAL: Denies fever and chills  - HEENT: Denies changes in vision and hearing.  - RESPIRATORY: Denies SOB and cough.  - CV: Denies chest pain and palpitations  - GI: Denies abdominal pain, nausea, vomiting and diarrhea.  - : Denies dysuria and urinary frequency.  - SKIN: Denies rash and pruritus.  - NEUROLOGICAL: Denies headache and syncope.  - PSYCHIATRIC: Denies recent changes in mood. Denies anxiety and depression. - CONSTITUTIONAL: Denies fever and chills  - HEENT: Denies changes in vision and hearing.  - RESPIRATORY: + SOB. No cough.  - CV: Denies chest pain and palpitations  - GI: Denies abdominal pain, nausea, vomiting and diarrhea.  - : Denies dysuria and urinary frequency.  - SKIN: Denies rash and pruritus.  - NEUROLOGICAL: Denies headache and syncope.  - PSYCHIATRIC: Denies recent changes in mood. Denies anxiety and depression.

## 2024-10-08 NOTE — ED PROVIDER NOTE - PROGRESS NOTE DETAILS
Lucks-DO: pt received at sign-out.  Pt sitting comfortably in NAD. XR showing fluid overload, lasix ordered. Discussed with Dr. Hanley and MAR re: admission.

## 2024-10-08 NOTE — H&P ADULT - ATTENDING COMMENTS
Pt appearing to have lapsed into acute on CHF(with 3-4 pillow orthopnea), exacerbated and decompensated likely by being in a-fib. Pt is assumed to be approximately 30-40lbs into fluid overload with goal to achieve this much in negative fluid balance. Pt appearing to have lapsed into acute on CHF(with 3-4 pillow orthopnea), exacerbated and decompensated likely by being in concurrent a-fib. Pt is assumed to be approximately 30-40lbs into fluid overload with goal to achieve this much in negative fluid balance.

## 2024-10-08 NOTE — ED PROVIDER NOTE - CLINICAL SUMMARY MEDICAL DECISION MAKING FREE TEXT BOX
74-year-old female brought in for increased work of breathing.  Patient drops to 88% on room air.  Placed on 3 L nasal cannula.  Concern for CHF exacerbation versus COPD exacerbation versus viral illness versus pneumonia.  Will obtain labs and chest x-ray to assess.  Patient will likely need to be admitted

## 2024-10-09 ENCOUNTER — RESULT REVIEW (OUTPATIENT)
Age: 74
End: 2024-10-09

## 2024-10-09 LAB
A1C WITH ESTIMATED AVERAGE GLUCOSE RESULT: 5.9 % — HIGH (ref 4–5.6)
ALBUMIN SERPL ELPH-MCNC: 2.9 G/DL — LOW (ref 3.5–5)
ALP SERPL-CCNC: 61 U/L — SIGNIFICANT CHANGE UP (ref 40–120)
ALT FLD-CCNC: 18 U/L DA — SIGNIFICANT CHANGE UP (ref 10–60)
ANION GAP SERPL CALC-SCNC: 7 MMOL/L — SIGNIFICANT CHANGE UP (ref 5–17)
AST SERPL-CCNC: 17 U/L — SIGNIFICANT CHANGE UP (ref 10–40)
BASOPHILS # BLD AUTO: 0.05 K/UL — SIGNIFICANT CHANGE UP (ref 0–0.2)
BASOPHILS NFR BLD AUTO: 0.9 % — SIGNIFICANT CHANGE UP (ref 0–2)
BILIRUB SERPL-MCNC: 0.6 MG/DL — SIGNIFICANT CHANGE UP (ref 0.2–1.2)
BUN SERPL-MCNC: 25 MG/DL — HIGH (ref 7–18)
CALCIUM SERPL-MCNC: 9.9 MG/DL — SIGNIFICANT CHANGE UP (ref 8.4–10.5)
CHLORIDE SERPL-SCNC: 109 MMOL/L — HIGH (ref 96–108)
CHOLEST SERPL-MCNC: 112 MG/DL — SIGNIFICANT CHANGE UP
CO2 SERPL-SCNC: 27 MMOL/L — SIGNIFICANT CHANGE UP (ref 22–31)
CREAT SERPL-MCNC: 0.97 MG/DL — SIGNIFICANT CHANGE UP (ref 0.5–1.3)
EGFR: 62 ML/MIN/1.73M2 — SIGNIFICANT CHANGE UP
EOSINOPHIL # BLD AUTO: 0.17 K/UL — SIGNIFICANT CHANGE UP (ref 0–0.5)
EOSINOPHIL NFR BLD AUTO: 3.1 % — SIGNIFICANT CHANGE UP (ref 0–6)
ESTIMATED AVERAGE GLUCOSE: 123 MG/DL — HIGH (ref 68–114)
GLUCOSE BLDC GLUCOMTR-MCNC: 127 MG/DL — HIGH (ref 70–99)
GLUCOSE BLDC GLUCOMTR-MCNC: 76 MG/DL — SIGNIFICANT CHANGE UP (ref 70–99)
GLUCOSE BLDC GLUCOMTR-MCNC: 95 MG/DL — SIGNIFICANT CHANGE UP (ref 70–99)
GLUCOSE BLDC GLUCOMTR-MCNC: 95 MG/DL — SIGNIFICANT CHANGE UP (ref 70–99)
GLUCOSE SERPL-MCNC: 94 MG/DL — SIGNIFICANT CHANGE UP (ref 70–99)
HCT VFR BLD CALC: 34.2 % — LOW (ref 34.5–45)
HCV AB S/CO SERPL IA: 0.08 S/CO — SIGNIFICANT CHANGE UP (ref 0–0.99)
HCV AB SERPL-IMP: SIGNIFICANT CHANGE UP
HDLC SERPL-MCNC: 48 MG/DL — LOW
HGB BLD-MCNC: 10.9 G/DL — LOW (ref 11.5–15.5)
IMM GRANULOCYTES NFR BLD AUTO: 0.2 % — SIGNIFICANT CHANGE UP (ref 0–0.9)
LIPID PNL WITH DIRECT LDL SERPL: 51 MG/DL — SIGNIFICANT CHANGE UP
LYMPHOCYTES # BLD AUTO: 1.33 K/UL — SIGNIFICANT CHANGE UP (ref 1–3.3)
LYMPHOCYTES # BLD AUTO: 24 % — SIGNIFICANT CHANGE UP (ref 13–44)
MAGNESIUM SERPL-MCNC: 2 MG/DL — SIGNIFICANT CHANGE UP (ref 1.6–2.6)
MCHC RBC-ENTMCNC: 30 PG — SIGNIFICANT CHANGE UP (ref 27–34)
MCHC RBC-ENTMCNC: 31.9 GM/DL — LOW (ref 32–36)
MCV RBC AUTO: 94.2 FL — SIGNIFICANT CHANGE UP (ref 80–100)
MONOCYTES # BLD AUTO: 0.51 K/UL — SIGNIFICANT CHANGE UP (ref 0–0.9)
MONOCYTES NFR BLD AUTO: 9.2 % — SIGNIFICANT CHANGE UP (ref 2–14)
NEUTROPHILS # BLD AUTO: 3.47 K/UL — SIGNIFICANT CHANGE UP (ref 1.8–7.4)
NEUTROPHILS NFR BLD AUTO: 62.6 % — SIGNIFICANT CHANGE UP (ref 43–77)
NON HDL CHOLESTEROL: 64 MG/DL — SIGNIFICANT CHANGE UP
NRBC # BLD: 0 /100 WBCS — SIGNIFICANT CHANGE UP (ref 0–0)
PHOSPHATE SERPL-MCNC: 3.5 MG/DL — SIGNIFICANT CHANGE UP (ref 2.5–4.5)
PLATELET # BLD AUTO: 193 K/UL — SIGNIFICANT CHANGE UP (ref 150–400)
POTASSIUM SERPL-MCNC: 3.8 MMOL/L — SIGNIFICANT CHANGE UP (ref 3.5–5.3)
POTASSIUM SERPL-SCNC: 3.8 MMOL/L — SIGNIFICANT CHANGE UP (ref 3.5–5.3)
PROT SERPL-MCNC: 6.3 G/DL — SIGNIFICANT CHANGE UP (ref 6–8.3)
RBC # BLD: 3.63 M/UL — LOW (ref 3.8–5.2)
RBC # FLD: 13.2 % — SIGNIFICANT CHANGE UP (ref 10.3–14.5)
SODIUM SERPL-SCNC: 143 MMOL/L — SIGNIFICANT CHANGE UP (ref 135–145)
TRIGL SERPL-MCNC: 64 MG/DL — SIGNIFICANT CHANGE UP
TSH SERPL-MCNC: 2.37 UU/ML — SIGNIFICANT CHANGE UP (ref 0.34–4.82)
WBC # BLD: 5.54 K/UL — SIGNIFICANT CHANGE UP (ref 3.8–10.5)
WBC # FLD AUTO: 5.54 K/UL — SIGNIFICANT CHANGE UP (ref 3.8–10.5)

## 2024-10-09 RX ORDER — FUROSEMIDE 10 MG/ML
20 INJECTION INTRAVENOUS DAILY
Refills: 0 | Status: DISCONTINUED | OUTPATIENT
Start: 2024-10-09 | End: 2024-10-11

## 2024-10-09 RX ORDER — INFLUENZA VIRUS VACCINE 15; 15; 15; 15 UG/.5ML; UG/.5ML; UG/.5ML; UG/.5ML
0.5 SUSPENSION INTRAMUSCULAR ONCE
Refills: 0 | Status: DISCONTINUED | OUTPATIENT
Start: 2024-10-09 | End: 2024-10-11

## 2024-10-09 RX ADMIN — Medication 30 MILLIGRAM(S): at 13:49

## 2024-10-09 RX ADMIN — PANTOPRAZOLE SODIUM 40 MILLIGRAM(S): 40 TABLET, DELAYED RELEASE ORAL at 05:53

## 2024-10-09 RX ADMIN — Medication 1 DOSE(S): at 21:55

## 2024-10-09 RX ADMIN — APIXABAN 5 MILLIGRAM(S): 5 TABLET, FILM COATED ORAL at 17:56

## 2024-10-09 RX ADMIN — APIXABAN 5 MILLIGRAM(S): 5 TABLET, FILM COATED ORAL at 05:53

## 2024-10-09 RX ADMIN — ROSUVASTATIN CALCIUM 20 MILLIGRAM(S): 20 TABLET, COATED ORAL at 21:54

## 2024-10-09 RX ADMIN — Medication 1 DOSE(S): at 11:13

## 2024-10-09 RX ADMIN — Medication 10 MILLIGRAM(S): at 05:53

## 2024-10-09 RX ADMIN — FUROSEMIDE 20 MILLIGRAM(S): 10 INJECTION INTRAVENOUS at 13:55

## 2024-10-09 RX ADMIN — QUETIAPINE FUMARATE 25 MILLIGRAM(S): 50 TABLET, FILM COATED ORAL at 21:54

## 2024-10-09 NOTE — PROGRESS NOTE ADULT - SUBJECTIVE AND OBJECTIVE BOX
SARATH ELENA  MR# [id number]  74yFemale        Patient is a 74y old  Female who presents with a chief complaint of AHRF 2/2 CHF (08 Oct 2024 16:33)      INTERVAL HPI/OVERNIGHT EVENTS:  Patient seen and examined at bedside. No notations of chest pain, palpitation, SOB, orthopnea, nausea, vomiting or abdominal pain.    ALLERGIES  No Known Allergies      MEDICATIONS  acetaminophen     Tablet .. 650 milliGRAM(s) Oral every 6 hours PRN Temp greater or equal to 38C (100.4F), Mild Pain (1 - 3)  aluminum hydroxide/magnesium hydroxide/simethicone Suspension 30 milliLiter(s) Oral every 4 hours PRN Dyspepsia  amLODIPine   Tablet 10 milliGRAM(s) Oral daily  apixaban 5 milliGRAM(s) Oral every 12 hours  clopidogrel Tablet 75 milliGRAM(s) Oral daily  dextrose 5%. 1000 milliLiter(s) IV Continuous <Continuous>  dextrose 50% Injectable 25 Gram(s) IV Push once  dextrose Oral Gel 15 Gram(s) Oral once PRN Blood Glucose LESS THAN 70 milliGRAM(s)/deciliter  donepezil 5 milliGRAM(s) Oral at bedtime  DULoxetine 30 milliGRAM(s) Oral daily  fluticasone propionate/ salmeterol 100-50 MICROgram(s) Diskus 1 Dose(s) Inhalation two times a day  glucagon  Injectable 1 milliGRAM(s) IntraMuscular once  influenza  Vaccine (HIGH DOSE) 0.5 milliLiter(s) IntraMuscular once  insulin lispro (ADMELOG) corrective regimen sliding scale   SubCutaneous three times a day before meals  melatonin 3 milliGRAM(s) Oral at bedtime PRN Insomnia  ondansetron Injectable 4 milliGRAM(s) IV Push every 8 hours PRN Nausea and/or Vomiting  pantoprazole    Tablet 40 milliGRAM(s) Oral before breakfast  QUEtiapine 25 milliGRAM(s) Oral at bedtime  rosuvastatin 20 milliGRAM(s) Oral at bedtime              REVIEW OF SYSTEMS:  CONSTITUTIONAL: No fever, weight loss, or fatigue  EYES: No eye pain, visual disturbances, or discharge  ENT:  No difficulty hearing, tinnitus, vertigo; No sinus or throat pain  NECK: No pain or stiffness  RESPIRATORY: No cough, wheezing, chills or hemoptysis; No Shortness of Breath  CARDIOVASCULAR: No chest pain, palpitations, passing out, dizziness, or leg swelling  GASTROINTESTINAL: No abdominal or epigastric pain. No nausea, vomiting, or hematemesis; No diarrhea or constipation. No melena or hematochezia.  GENITOURINARY: No dysuria, frequency, hematuria, or incontinence  NEUROLOGICAL: No headaches, memory loss, loss of strength, numbness, or tremors  SKIN: No itching, burning, rashes, or lesions   LYMPH Nodes: No enlarged glands  ENDOCRINE: No heat or cold intolerance; No hair loss  MUSCULOSKELETAL: No joint pain or swelling; No muscle, back, or extremity pain  PSYCHIATRIC: No depression, anxiety, mood swings, or difficulty sleeping  HEME/LYMPH: No easy bruising, or bleeding gums  ALLERGY AND IMMUNOLOGIC: No hives or eczema	    [ ] All others negative	  [ ] Unable to obtain      T(C): 36.2 (10-09-24 @ 10:24), Max: 36.9 (10-08-24 @ 22:13)  T(F): 97.2 (10-09-24 @ 10:24), Max: 98.4 (10-08-24 @ 22:13)  HR: 82 (10-09-24 @ 10:24) (72 - 97)  BP: 147/81 (10-09-24 @ 10:24) (112/54 - 169/73)  RR: 19 (10-09-24 @ 10:24) (18 - 20)  SpO2: 89% (10-09-24 @ 10:24) (89% - 100%)  Wt(kg): --    I&O's Summary        PHYSICAL EXAM:  A X O x  HEAD:  Atraumatic, Normocephalic  EYES: EOMI, PERRLA, conjunctiva and sclera clear  NECK: Supple, No JVD, Normal thyroid  Resp: CTAB, No crackles, wheezing,   CVS: Regular rate and rhythm; No discernable murmurs, rubs, or gallops  ABD: Soft, Nontender, Nondistended; Bowel sounds present  EXTREMITIES:  2+ Peripheral Pulses, No edema  LYMPH: No dicernable lymphadenopathy noted  GENERAL: NAD, well-groomed, well-developed      LABS:                        10.9   5.54  )-----------( 193      ( 09 Oct 2024 06:41 )             34.2     10-09    143  |  109[H]  |  25[H]  ----------------------------<  94  3.8   |  27  |  0.97    Ca    9.9      09 Oct 2024 06:41  Phos  3.5     10-09  Mg     2.0     10-09    TPro  6.3  /  Alb  2.9[L]  /  TBili  0.6  /  DBili  x   /  AST  17  /  ALT  18  /  AlkPhos  61  10-09      Urinalysis Basic - ( 09 Oct 2024 06:41 )    Color: x / Appearance: x / SG: x / pH: x  Gluc: 94 mg/dL / Ketone: x  / Bili: x / Urobili: x   Blood: x / Protein: x / Nitrite: x   Leuk Esterase: x / RBC: x / WBC x   Sq Epi: x / Non Sq Epi: x / Bacteria: x      CAPILLARY BLOOD GLUCOSE      POCT Blood Glucose.: 127 mg/dL (09 Oct 2024 11:29)  POCT Blood Glucose.: 76 mg/dL (09 Oct 2024 08:14)  POCT Blood Glucose.: 91 mg/dL (08 Oct 2024 19:14)      Troponins:  ProBNP:  Lipid Profile:   HgA1c:  TSH:           RADIOLOGY & ADDITIONAL TESTS:    Imaging Personally Reviewed:  [ ] YES  [ ] NO      Consultant(s) Notes Reviewed:  [x ] YES  [ ] NO    Care Discussed with Consultants/Other Providers [ x] YES  [ ] NO          PAST MEDICAL & SURGICAL HISTORY:  HTN (hypertension)      HLD (hyperlipidemia)      DM (diabetes mellitus), type 2      CHF (congestive heart failure)      Afib      DM (diabetes mellitus)      HTN (hypertension)      HLD (hyperlipidemia)      HTN (hypertension)      Atrial fibrillation      DM (diabetes mellitus)      History of knee replacement, right  had surgery three yrs ago            Heart failure    H/o or current diagnosis of HF- ACEI/ARB contraindication unknown    H/o or current diagnosis of HF- Contraindication to ACEI/ARBs    No pertinent family history in first degree relatives    No pertinent family history in first degree relatives    Handoff    MEWS Score    HTN (hypertension)    HLD (hyperlipidemia)    DM (diabetes mellitus), type 2    CHF (congestive heart failure)    Afib    DM (diabetes mellitus)    HTN (hypertension)    HLD (hyperlipidemia)    HTN (hypertension)    Atrial fibrillation    DM (diabetes mellitus)    CHF exacerbation    Acute respiratory failure with hypoxia    Acute CHF    Accelerated hypertension    Hypertension    Hyperlipemia    Diabetes mellitus    Atrial fibrillation    Preventive measure    History of knee replacement, right    A) DIFF BREATHING    90+    [email or web address]

## 2024-10-09 NOTE — CONSULT NOTE ADULT - SUBJECTIVE AND OBJECTIVE BOX
Time of visit:    CHIEF COMPLAINT: Patient is a 74y old  Female who presents with a chief complaint of AHRF 2/2 CHF (09 Oct 2024 12:42)      HPI:  Patient is a 75 y/o F with a PmHx of HTN, DM, HLD, asthma, afib (dx 8 years ago), who presents to the ED with a 5 day hx of worsening SOB, orthopnea and lower extremity swelling. Patient notes she started to feel SOB 5 days ago and went to an outpatient clinic, who prescribed her inhalers. The inhalers did not help, and an SPO2 reading of 60% this morning prompted family to call EMS and bring patient to the ED. Currently feels less SOB with O2 NC 4L. Patient reports she had a similar episode of SOB 8 years ago, and was diagnosed with fluid overload and afib at that hospitalization. Denies any CP, cough, fevers, sick contacts.     In ED    EKG: Afib  Trop 74.7, BNP 1680  CXR: B/L effusions  S/p 40mg IV lasix (08 Oct 2024 16:33)   Patient seen and examined.     PAST MEDICAL & SURGICAL HISTORY:  HTN (hypertension)      HLD (hyperlipidemia)      DM (diabetes mellitus), type 2      CHF (congestive heart failure)      Afib      DM (diabetes mellitus)      HTN (hypertension)      HLD (hyperlipidemia)      HTN (hypertension)      Atrial fibrillation      DM (diabetes mellitus)      History of knee replacement, right  had surgery three yrs ago          Allergies    No Known Allergies    Intolerances        MEDICATIONS  (STANDING):  amLODIPine   Tablet 10 milliGRAM(s) Oral daily  apixaban 5 milliGRAM(s) Oral every 12 hours  clopidogrel Tablet 75 milliGRAM(s) Oral daily  dextrose 5%. 1000 milliLiter(s) (50 mL/Hr) IV Continuous <Continuous>  dextrose 50% Injectable 25 Gram(s) IV Push once  donepezil 5 milliGRAM(s) Oral at bedtime  DULoxetine 30 milliGRAM(s) Oral daily  fluticasone propionate/ salmeterol 100-50 MICROgram(s) Diskus 1 Dose(s) Inhalation two times a day  furosemide   Injectable 20 milliGRAM(s) IV Push daily  glucagon  Injectable 1 milliGRAM(s) IntraMuscular once  influenza  Vaccine (HIGH DOSE) 0.5 milliLiter(s) IntraMuscular once  insulin lispro (ADMELOG) corrective regimen sliding scale   SubCutaneous three times a day before meals  pantoprazole    Tablet 40 milliGRAM(s) Oral before breakfast  QUEtiapine 25 milliGRAM(s) Oral at bedtime  rosuvastatin 20 milliGRAM(s) Oral at bedtime      MEDICATIONS  (PRN):  acetaminophen     Tablet .. 650 milliGRAM(s) Oral every 6 hours PRN Temp greater or equal to 38C (100.4F), Mild Pain (1 - 3)  aluminum hydroxide/magnesium hydroxide/simethicone Suspension 30 milliLiter(s) Oral every 4 hours PRN Dyspepsia  dextrose Oral Gel 15 Gram(s) Oral once PRN Blood Glucose LESS THAN 70 milliGRAM(s)/deciliter  melatonin 3 milliGRAM(s) Oral at bedtime PRN Insomnia  ondansetron Injectable 4 milliGRAM(s) IV Push every 8 hours PRN Nausea and/or Vomiting   Medications up to date at time of exam.    Medications up to date at time of exam.    FAMILY HISTORY:  No pertinent family history in first degree relatives    No pertinent family history in first degree relatives        SOCIAL HISTORY  Smoking History: [   ] smoking/smoke exposure, [   ] former smoker  Living Condition: [   ] apartment, [   ] private house  Work History:   Travel History: denies recent travel  Illicit Substance Use: denies  Alcohol Use: denies    REVIEW OF SYSTEMS:    CONSTITUTIONAL:  denies fevers, chills, sweats, weight loss    HEENT:  denies diplopia or blurred vision, sore throat or runny nose.    CARDIOVASCULAR:  denies pressure, squeezing, tightness, or heaviness about the chest; no palpitations.    RESPIRATORY:  denies SOB, cough, BROOKS, wheezing.    GASTROINTESTINAL:  denies abdominal pain, nausea, vomiting or diarrhea.    GENITOURINARY: denies dysuria, frequency or urgency.    NEUROLOGIC:  denies numbness, tingling, seizures or weakness.    PSYCHIATRIC:  denies disorder of thought or mood.    MSK: denies swelling, redness      PHYSICAL EXAMINATION:    GENERAL: The patient  in no apparent distress.     Vital Signs Last 24 Hrs  T(C): 37.1 (09 Oct 2024 13:32), Max: 37.1 (09 Oct 2024 13:32)  T(F): 98.8 (09 Oct 2024 13:32), Max: 98.8 (09 Oct 2024 13:32)  HR: 96 (09 Oct 2024 13:32) (74 - 96)  BP: 154/99 (09 Oct 2024 13:32) (112/54 - 169/73)  BP(mean): 117 (09 Oct 2024 13:32) (70 - 117)  RR: 18 (09 Oct 2024 13:32) (18 - 20)  SpO2: 91% (09 Oct 2024 13:32) (89% - 96%)    Parameters below as of 09 Oct 2024 13:32  Patient On (Oxygen Delivery Method): room air       (if applicable)    Chest Tube (if applicable)    HEENT: Head is normocephalic and atraumatic. Extraocular muscles are intact. Mucous membranes are moist.     NECK: Supple, no palpable adenopathy.    LUNGS: Fair b/l breath sounds, no wheezing, rales, or rhonchi.    HEART: Regular rate and rhythm without murmur.    ABDOMEN: Soft, nontender, and nondistended.  No hepatosplenomegaly is noted.    RENAL: No difficulty voiding, no pelvic pain    EXTREMITIES: Without any cyanosis, clubbing, rash, lesions or edema.    NEUROLOGIC: Awake, alert, oriented, grossly intact    SKIN: Warm, dry, good turgor.      LABS:                        10.9   5.54  )-----------( 193      ( 09 Oct 2024 06:41 )             34.2     10-09    143  |  109[H]  |  25[H]  ----------------------------<  94  3.8   |  27  |  0.97    Ca    9.9      09 Oct 2024 06:41  Phos  3.5     10-09  Mg     2.0     10-09    TPro  6.3  /  Alb  2.9[L]  /  TBili  0.6  /  DBili  x   /  AST  17  /  ALT  18  /  AlkPhos  61  10-09      Urinalysis Basic - ( 09 Oct 2024 06:41 )    Color: x / Appearance: x / SG: x / pH: x  Gluc: 94 mg/dL / Ketone: x  / Bili: x / Urobili: x   Blood: x / Protein: x / Nitrite: x   Leuk Esterase: x / RBC: x / WBC x   Sq Epi: x / Non Sq Epi: x / Bacteria: x        MICROBIOLOGY: (if applicable)    RADIOLOGY & ADDITIONAL STUDIES:  EKG:   CXR:< from: Xray Chest 1 View- PORTABLE-Urgent (10.08.24 @ 13:10) >  IMPRESSION:  Cardiomegaly.  Lower zone airspace consolidations and/or effusions.      < end of copied text >    ECHO:    IMPRESSION: 74y Female PAST MEDICAL & SURGICAL HISTORY:  HTN (hypertension)      HLD (hyperlipidemia)      DM (diabetes mellitus), type 2      CHF (congestive heart failure)      Afib      DM (diabetes mellitus)      HTN (hypertension)      HLD (hyperlipidemia)      HTN (hypertension)      Atrial fibrillation      DM (diabetes mellitus)      History of knee replacement, right  had surgery three yrs ago       p/w                  Time of visit:    CHIEF COMPLAINT: Patient is a 74y old  Female who presents with a chief complaint of AHRF 2/2 CHF (09 Oct 2024 12:42)      HPI: Patient is a 74 yr old woman  with  HTN, DM, HLD, asthma, afib (dx 8 years ago), who presents to the ED with a 5 day hx of worsening SOB, orthopnea and lower extremity swelling. Patient notes she started to feel SOB 5 days ago and went to an outpatient clinic, who prescribed her inhalers. The inhalers did not help, and an SPO2 reading of 60% this morning prompted family to call EMS and bring patient to the ED. Currently feels less SOB with O2 NC 4L. Patient reports she had a similar episode of SOB 8 years ago, and was diagnosed with fluid overload and afib at that hospitalization. Denies any CP, cough, fevers, sick contacts.     In ED    EKG: Afib  Trop 74.7, BNP 1680  CXR: B/L effusions  S/p 40mg IV lasix (08 Oct 2024 16:33)   Patient seen and examined.     PAST MEDICAL & SURGICAL HISTORY:  HTN (hypertension)      HLD (hyperlipidemia)      DM (diabetes mellitus), type 2      CHF (congestive heart failure)      Afib      DM (diabetes mellitus)      HTN (hypertension)      HLD (hyperlipidemia)      HTN (hypertension)      Atrial fibrillation      DM (diabetes mellitus)      History of knee replacement, right  had surgery three yrs ago          Allergies    No Known Allergies    Intolerances        MEDICATIONS  (STANDING):  amLODIPine   Tablet 10 milliGRAM(s) Oral daily  apixaban 5 milliGRAM(s) Oral every 12 hours  clopidogrel Tablet 75 milliGRAM(s) Oral daily  dextrose 5%. 1000 milliLiter(s) (50 mL/Hr) IV Continuous <Continuous>  dextrose 50% Injectable 25 Gram(s) IV Push once  donepezil 5 milliGRAM(s) Oral at bedtime  DULoxetine 30 milliGRAM(s) Oral daily  fluticasone propionate/ salmeterol 100-50 MICROgram(s) Diskus 1 Dose(s) Inhalation two times a day  furosemide   Injectable 20 milliGRAM(s) IV Push daily  glucagon  Injectable 1 milliGRAM(s) IntraMuscular once  influenza  Vaccine (HIGH DOSE) 0.5 milliLiter(s) IntraMuscular once  insulin lispro (ADMELOG) corrective regimen sliding scale   SubCutaneous three times a day before meals  pantoprazole    Tablet 40 milliGRAM(s) Oral before breakfast  QUEtiapine 25 milliGRAM(s) Oral at bedtime  rosuvastatin 20 milliGRAM(s) Oral at bedtime      MEDICATIONS  (PRN):  acetaminophen     Tablet .. 650 milliGRAM(s) Oral every 6 hours PRN Temp greater or equal to 38C (100.4F), Mild Pain (1 - 3)  aluminum hydroxide/magnesium hydroxide/simethicone Suspension 30 milliLiter(s) Oral every 4 hours PRN Dyspepsia  dextrose Oral Gel 15 Gram(s) Oral once PRN Blood Glucose LESS THAN 70 milliGRAM(s)/deciliter  melatonin 3 milliGRAM(s) Oral at bedtime PRN Insomnia  ondansetron Injectable 4 milliGRAM(s) IV Push every 8 hours PRN Nausea and/or Vomiting   Medications up to date at time of exam.    Medications up to date at time of exam.    FAMILY HISTORY:  No pertinent family history in first degree relatives    No pertinent family history in first degree relatives        SOCIAL HISTORY  Smoking History: [ x  ] none smoking/smoke exposure, [   ] former smoker  Living Condition: [   ] apartment, [   ] private house  Work History:   Travel History: denies recent travel  Illicit Substance Use: denies  Alcohol Use: denies    REVIEW OF SYSTEMS: as per EMR     CONSTITUTIONAL:  denies fevers, chills, sweats, weight loss    HEENT:  denies diplopia or blurred vision, sore throat or runny nose.    CARDIOVASCULAR:  denies pressure, squeezing, tightness, or heaviness about the chest; no palpitations.    RESPIRATORY:  denies SOB, cough, BROOKS, wheezing.    GASTROINTESTINAL:  denies abdominal pain, nausea, vomiting or diarrhea.    GENITOURINARY: denies dysuria, frequency or urgency.    NEUROLOGIC:  denies numbness, tingling, seizures or weakness.    PSYCHIATRIC:  denies disorder of thought or mood.    MSK: denies swelling, redness      PHYSICAL EXAMINATION:    GENERAL: The patient  in no apparent distress.     Vital Signs Last 24 Hrs  T(C): 37.1 (09 Oct 2024 13:32), Max: 37.1 (09 Oct 2024 13:32)  T(F): 98.8 (09 Oct 2024 13:32), Max: 98.8 (09 Oct 2024 13:32)  HR: 96 (09 Oct 2024 13:32) (74 - 96)  BP: 154/99 (09 Oct 2024 13:32) (112/54 - 169/73)  BP(mean): 117 (09 Oct 2024 13:32) (70 - 117)  RR: 18 (09 Oct 2024 13:32) (18 - 20)  SpO2: 91% (09 Oct 2024 13:32) (89% - 96%)    Parameters below as of 09 Oct 2024 13:32  Patient On (Oxygen Delivery Method): room air       (if applicable)    Chest Tube (if applicable)    HEENT: Head is normocephalic and atraumatic. Extraocular muscles are intact. Mucous membranes are moist.     NECK: Supple, no palpable adenopathy.    LUNGS: Fair b/l breath sounds, no wheezing, rales, or rhonchi.    HEART: Regular rate and rhythm without murmur.    ABDOMEN: Soft, nontender, and nondistended.  No hepatosplenomegaly is noted.    RENAL: No difficulty voiding, no pelvic pain    EXTREMITIES: Without any cyanosis, + b/l LE 1+  edema.    NEUROLOGIC: Awake, alert, oriented, grossly intact    SKIN: Warm, dry, good turgor.      LABS:                        10.9   5.54  )-----------( 193      ( 09 Oct 2024 06:41 )             34.2     10-09    143  |  109[H]  |  25[H]  ----------------------------<  94  3.8   |  27  |  0.97    Ca    9.9      09 Oct 2024 06:41  Phos  3.5     10-09  Mg     2.0     10-09    TPro  6.3  /  Alb  2.9[L]  /  TBili  0.6  /  DBili  x   /  AST  17  /  ALT  18  /  AlkPhos  61  10-09      Urinalysis Basic - ( 09 Oct 2024 06:41 )    Color: x / Appearance: x / SG: x / pH: x  Gluc: 94 mg/dL / Ketone: x  / Bili: x / Urobili: x   Blood: x / Protein: x / Nitrite: x   Leuk Esterase: x / RBC: x / WBC x   Sq Epi: x / Non Sq Epi: x / Bacteria: x        MICROBIOLOGY: (if applicable)    RADIOLOGY & ADDITIONAL STUDIES:  EKG:   CXR:< from: Xray Chest 1 View- PORTABLE-Urgent (10.08.24 @ 13:10) >  IMPRESSION:  Cardiomegaly.  Lower zone airspace consolidations and/or effusions.      < end of copied text >    ECHO: non recently     IMPRESSION: 74y Female PAST MEDICAL & SURGICAL HISTORY:  HTN (hypertension)      HLD (hyperlipidemia)      DM (diabetes mellitus), type 2      CHF (congestive heart failure)      Afib      DM (diabetes mellitus)      HTN (hypertension)      HLD (hyperlipidemia)      HTN (hypertension)      Atrial fibrillation      DM (diabetes mellitus)      History of knee replacement, right  had surgery three yrs ago       p/w       IMP:  This is a  74 yr old woman with  HTN, DM, HLD, asthma, afib (dx 8 years ago), who presents to the ED with a 5 day hx of worsening SOB, orthopnea and lower extremity swelling due to ex of HF resulting in acute hypoxic resp failure . Clinically improved with lasix . Asthma  is control     Sugg    - Continue O2 supp as needed to maintain sat >90%  - Diuresis   - Repeat 2DCHO   - Monitor blood glucose with coverage   - Repeat CXR in 48 hrs   - Anticoag for Afib     spoke with  at bedside

## 2024-10-10 ENCOUNTER — TRANSCRIPTION ENCOUNTER (OUTPATIENT)
Age: 74
End: 2024-10-10

## 2024-10-10 DIAGNOSIS — Z75.8 OTHER PROBLEMS RELATED TO MEDICAL FACILITIES AND OTHER HEALTH CARE: ICD-10-CM

## 2024-10-10 LAB
ANION GAP SERPL CALC-SCNC: 9 MMOL/L — SIGNIFICANT CHANGE UP (ref 5–17)
BUN SERPL-MCNC: 25 MG/DL — HIGH (ref 7–18)
CALCIUM SERPL-MCNC: 10 MG/DL — SIGNIFICANT CHANGE UP (ref 8.4–10.5)
CHLORIDE SERPL-SCNC: 104 MMOL/L — SIGNIFICANT CHANGE UP (ref 96–108)
CO2 SERPL-SCNC: 28 MMOL/L — SIGNIFICANT CHANGE UP (ref 22–31)
CREAT SERPL-MCNC: 1.05 MG/DL — SIGNIFICANT CHANGE UP (ref 0.5–1.3)
EGFR: 56 ML/MIN/1.73M2 — LOW
GLUCOSE BLDC GLUCOMTR-MCNC: 104 MG/DL — HIGH (ref 70–99)
GLUCOSE BLDC GLUCOMTR-MCNC: 106 MG/DL — HIGH (ref 70–99)
GLUCOSE BLDC GLUCOMTR-MCNC: 113 MG/DL — HIGH (ref 70–99)
GLUCOSE BLDC GLUCOMTR-MCNC: 119 MG/DL — HIGH (ref 70–99)
GLUCOSE SERPL-MCNC: 104 MG/DL — HIGH (ref 70–99)
HCT VFR BLD CALC: 36.7 % — SIGNIFICANT CHANGE UP (ref 34.5–45)
HGB BLD-MCNC: 11.9 G/DL — SIGNIFICANT CHANGE UP (ref 11.5–15.5)
MCHC RBC-ENTMCNC: 30.1 PG — SIGNIFICANT CHANGE UP (ref 27–34)
MCHC RBC-ENTMCNC: 32.4 GM/DL — SIGNIFICANT CHANGE UP (ref 32–36)
MCV RBC AUTO: 92.9 FL — SIGNIFICANT CHANGE UP (ref 80–100)
NRBC # BLD: 0 /100 WBCS — SIGNIFICANT CHANGE UP (ref 0–0)
PLATELET # BLD AUTO: 228 K/UL — SIGNIFICANT CHANGE UP (ref 150–400)
POTASSIUM SERPL-MCNC: 3.9 MMOL/L — SIGNIFICANT CHANGE UP (ref 3.5–5.3)
POTASSIUM SERPL-SCNC: 3.9 MMOL/L — SIGNIFICANT CHANGE UP (ref 3.5–5.3)
RBC # BLD: 3.95 M/UL — SIGNIFICANT CHANGE UP (ref 3.8–5.2)
RBC # FLD: 13.2 % — SIGNIFICANT CHANGE UP (ref 10.3–14.5)
SODIUM SERPL-SCNC: 141 MMOL/L — SIGNIFICANT CHANGE UP (ref 135–145)
WBC # BLD: 7.31 K/UL — SIGNIFICANT CHANGE UP (ref 3.8–10.5)
WBC # FLD AUTO: 7.31 K/UL — SIGNIFICANT CHANGE UP (ref 3.8–10.5)

## 2024-10-10 RX ADMIN — Medication 10 MILLIGRAM(S): at 06:28

## 2024-10-10 RX ADMIN — APIXABAN 5 MILLIGRAM(S): 5 TABLET, FILM COATED ORAL at 06:28

## 2024-10-10 RX ADMIN — Medication 1 DOSE(S): at 22:01

## 2024-10-10 RX ADMIN — Medication 650 MILLIGRAM(S): at 00:10

## 2024-10-10 RX ADMIN — Medication 5 MILLIGRAM(S): at 00:11

## 2024-10-10 RX ADMIN — Medication 30 MILLIGRAM(S): at 12:23

## 2024-10-10 RX ADMIN — FUROSEMIDE 20 MILLIGRAM(S): 10 INJECTION INTRAVENOUS at 06:28

## 2024-10-10 RX ADMIN — QUETIAPINE FUMARATE 25 MILLIGRAM(S): 50 TABLET, FILM COATED ORAL at 22:01

## 2024-10-10 RX ADMIN — Medication 1 DOSE(S): at 12:24

## 2024-10-10 RX ADMIN — Medication 650 MILLIGRAM(S): at 01:29

## 2024-10-10 RX ADMIN — Medication 650 MILLIGRAM(S): at 23:10

## 2024-10-10 RX ADMIN — APIXABAN 5 MILLIGRAM(S): 5 TABLET, FILM COATED ORAL at 18:30

## 2024-10-10 RX ADMIN — PANTOPRAZOLE SODIUM 40 MILLIGRAM(S): 40 TABLET, DELAYED RELEASE ORAL at 06:28

## 2024-10-10 RX ADMIN — Medication 650 MILLIGRAM(S): at 22:10

## 2024-10-10 RX ADMIN — Medication 5 MILLIGRAM(S): at 22:02

## 2024-10-10 RX ADMIN — ROSUVASTATIN CALCIUM 20 MILLIGRAM(S): 20 TABLET, COATED ORAL at 22:02

## 2024-10-10 NOTE — PROGRESS NOTE ADULT - SUBJECTIVE AND OBJECTIVE BOX
Time of Visit:  Patient seen and examined.     MEDICATIONS  (STANDING):  amLODIPine   Tablet 10 milliGRAM(s) Oral daily  apixaban 5 milliGRAM(s) Oral every 12 hours  clopidogrel Tablet 75 milliGRAM(s) Oral daily  dextrose 5%. 1000 milliLiter(s) (50 mL/Hr) IV Continuous <Continuous>  dextrose 50% Injectable 25 Gram(s) IV Push once  donepezil 5 milliGRAM(s) Oral at bedtime  DULoxetine 30 milliGRAM(s) Oral daily  fluticasone propionate/ salmeterol 100-50 MICROgram(s) Diskus 1 Dose(s) Inhalation two times a day  furosemide   Injectable 20 milliGRAM(s) IV Push daily  glucagon  Injectable 1 milliGRAM(s) IntraMuscular once  influenza  Vaccine (HIGH DOSE) 0.5 milliLiter(s) IntraMuscular once  insulin lispro (ADMELOG) corrective regimen sliding scale   SubCutaneous three times a day before meals  pantoprazole    Tablet 40 milliGRAM(s) Oral before breakfast  QUEtiapine 25 milliGRAM(s) Oral at bedtime  rosuvastatin 20 milliGRAM(s) Oral at bedtime      MEDICATIONS  (PRN):  acetaminophen     Tablet .. 650 milliGRAM(s) Oral every 6 hours PRN Temp greater or equal to 38C (100.4F), Mild Pain (1 - 3)  aluminum hydroxide/magnesium hydroxide/simethicone Suspension 30 milliLiter(s) Oral every 4 hours PRN Dyspepsia  dextrose Oral Gel 15 Gram(s) Oral once PRN Blood Glucose LESS THAN 70 milliGRAM(s)/deciliter  melatonin 3 milliGRAM(s) Oral at bedtime PRN Insomnia  ondansetron Injectable 4 milliGRAM(s) IV Push every 8 hours PRN Nausea and/or Vomiting       Medications up to date at time of exam.    ROS; No fever, chills, cough, nasal congestion on exam.   PHYSICAL EXAMINATION:  Vital Signs Last 24 Hrs  T(C): 36.4 (10 Oct 2024 13:51), Max: 37 (09 Oct 2024 19:27)  T(F): 97.5 (10 Oct 2024 13:51), Max: 98.6 (09 Oct 2024 19:27)  HR: 83 (10 Oct 2024 13:51) (78 - 93)  BP: 118/68 (10 Oct 2024 13:51) (118/68 - 153/56)  BP(mean): 83 (10 Oct 2024 13:51) (73 - 108)  RR: 18 (10 Oct 2024 13:51) (17 - 18)  SpO2: 94% (10 Oct 2024 13:51) (86% - 94%)    Parameters below as of 10 Oct 2024 13:51  Patient On (Oxygen Delivery Method): room air       (if applicable)    General: Alert and oriented. Able to answer question at rest with no SOB. No acute distress .     HEENT: Head is normocephalic and atraumatic. Extraocular muscles are intact. Mucous membranes are moist.     NECK: Supple, no palpable adenopathy.    LUNGS: Non labored. No rales, wheezing. No use of accessory muscle.     HEART: S1 S2 Irregular rate and rhythm . No JVD.     ABDOMEN: Soft, nontender, and nondistended.  Active bowel sounds.     EXTREMITIES: Without any cyanosis, clubbing, rash, lesions or edema.    NEUROLOGIC: Awake, alert, oriented.     SKIN: Warm, dry, good turgor.      LABS:                        11.9   7.31  )-----------( 228      ( 10 Oct 2024 06:36 )             36.7     10-10    141  |  104  |  25[H]  ----------------------------<  104[H]  3.9   |  28  |  1.05    Ca    10.0      10 Oct 2024 06:36  Phos  3.5     10-09  Mg     2.0     10-09    TPro  6.3  /  Alb  2.9[L]  /  TBili  0.6  /  DBili  x   /  AST  17  /  ALT  18  /  AlkPhos  61  10-09      Urinalysis Basic - ( 10 Oct 2024 06:36 )    Color: x / Appearance: x / SG: x / pH: x  Gluc: 104 mg/dL / Ketone: x  / Bili: x / Urobili: x   Blood: x / Protein: x / Nitrite: x   Leuk Esterase: x / RBC: x / WBC x   Sq Epi: x / Non Sq Epi: x / Bacteria: x      MICROBIOLOGY: (if applicable)    RADIOLOGY & ADDITIONAL STUDIES:  EKG:     CXR: < from: Xray Chest 1 View- PORTABLE-Urgent (10.08.24 @ 13:10) >    ACC: [id number] EXAM:  XR CHEST PORTABLE URGENT 1V   ORDERED BY: SIENNA MOHR     PROCEDURE DATE:  10/08/2024          INTERPRETATION:  Portable AP chest radiograph    COMPARISON: 3/14/2015 chest x-ray.    CLINICAL INFORMATION: Chest Pain.    FINDINGS:  CATHETERS AND TUBES: None    PULMONARY: Pulmonary vascular congestion.  Bilateral lower zone airspace consolidations and/or moderate effusion   obscuring diaphragm contours. Apices clear.  No pneumothorax.    HEART/VASCULAR: The  heart is enlarged in transverse diameter.    BONES: The visualized osseous thorax is intact.    IMPRESSION:  Cardiomegaly.  Lower zone airspace consolidations and/or effusions.    --- End of Report ---            NADYA HUMPHREY MD; Attending Radiologist  This document has been electronically signed. Oct  8 2024  2:10PM    < end of copied text >    ECHO:    IMPRESSION: 74y Female PAST MEDICAL & SURGICAL HISTORY:  HTN (hypertension)      HLD (hyperlipidemia)      DM (diabetes mellitus), type 2      CHF (congestive heart failure)      Afib      DM (diabetes mellitus)      HTN (hypertension)      HLD (hyperlipidemia)      HTN (hypertension)      Atrial fibrillation      DM (diabetes mellitus)      History of knee replacement, right  had surgery three yrs ago    Impression: This is a 73 Y/O Female presented to ED with worsening of shortness of breath x 5 days with B/L LE swelling . For pulmonary follow up of Acute hypoxic respiratory failure due to Volume overload secondary to CHF exacerbation. CXR with B/L lower zone Effusions  .      Suggestion:  O2 saturation 95% with O2 supplement. Continue Oxygen supplementation 2L NC . Monitor on exertion O2 saturation trend room air.   Continue Advair 100- 50 mcg Twice daily.   Pleural effusion no need to drain/ Tap at this time.  On lasix 20 mg IVP Daily.   On Apixaban 5 mg Q 12 Hours.   Can benefit to have PFT outpatient .   Strict I & O . Maintain 1200 ml fluid restriction.

## 2024-10-10 NOTE — DISCHARGE NOTE PROVIDER - NSDCMRMEDTOKEN_GEN_ALL_CORE_FT
amLODIPine 10 mg oral tablet: 1 tab(s) orally once a day  ascorbic acid: 250 milligram(s) once a day  Bystolic 20 mg oral tablet: 1 tab(s) orally once a day  clopidogrel 75 mg oral tablet: 1 tab(s) orally once a day  Dexilant 60 mg oral delayed release capsule: 1 cap(s) orally once a day  donepezil 5 mg oral tablet: 1 tab(s) orally once a day  DULoxetine 30 mg oral delayed release capsule: 1 cap(s) orally once a day  Eliquis 5 mg oral tablet: 1 tab(s) orally 2 times a day  ergocalciferol 1.25 mg (50,000 intl units) oral capsule: 1 cap(s) orally once a week  ipratropium 21 mcg/inh (0.03%) nasal spray: 2 spray(s) intranasally 2 times a day  meloxicam 5 mg oral capsule: 1 cap(s) orally once a day  Mounjaro 15 mg/0.5 mL subcutaneous solution: 15 milligram(s) subcutaneously  pioglitazone 15 mg oral tablet: 1 tab(s) orally once a day  QUEtiapine 25 mg oral tablet: 1 tab(s) orally once a day  rosuvastatin 20 mg oral tablet: 1 tab(s) orally once a day (at bedtime)  Symbicort 160 mcg-4.5 mcg/inh inhalation aerosol: 2 puff(s) inhaled 2 times a day   amLODIPine 10 mg oral tablet: 1 tab(s) orally once a day  ascorbic acid: 250 milligram(s) once a day  Bystolic 20 mg oral tablet: 1 tab(s) orally once a day  clopidogrel 75 mg oral tablet: 1 tab(s) orally once a day  Dexilant 60 mg oral delayed release capsule: 1 cap(s) orally once a day  donepezil 5 mg oral tablet: 1 tab(s) orally once a day  DULoxetine 30 mg oral delayed release capsule: 1 cap(s) orally once a day  Eliquis 5 mg oral tablet: 1 tab(s) orally 2 times a day  ergocalciferol 1.25 mg (50,000 intl units) oral capsule: 1 cap(s) orally once a week  ipratropium 21 mcg/inh (0.03%) nasal spray: 2 spray(s) intranasally 2 times a day  Lasix 20 mg oral tablet: 1 tab(s) orally once a day  meloxicam 5 mg oral capsule: 1 cap(s) orally once a day  Mounjaro 15 mg/0.5 mL subcutaneous solution: 15 milligram(s) subcutaneously  pioglitazone 15 mg oral tablet: 1 tab(s) orally once a day  QUEtiapine 25 mg oral tablet: 1 tab(s) orally once a day  rosuvastatin 20 mg oral tablet: 1 tab(s) orally once a day (at bedtime)  Symbicort 160 mcg-4.5 mcg/inh inhalation aerosol: 2 puff(s) inhaled 2 times a day

## 2024-10-10 NOTE — CONSULT NOTE ADULT - SUBJECTIVE AND OBJECTIVE BOX
Date of Service  10-10-24 @ 11:13    CHIEF COMPLAINT:Patient is a 74y old  Female who presents with a chief complaint of AHRF 2/2 CHF.      HPI:  Patient is a 75 y/o F with a PmHx of HTN, DM, HLD, asthma, afib (dx 8 years ago), who presents to the ED with a 5 day hx of worsening SOB, orthopnea and lower extremity swelling. Patient notes she started to feel SOB 5 days ago and went to an outpatient clinic, who prescribed her inhalers. The inhalers did not help, and an SPO2 reading of 60% this morning prompted family to call EMS and bring patient to the ED. Currently feels less SOB with O2 NC 4L. Patient reports she had a similar episode of SOB 8 years ago, and was diagnosed with fluid overload and afib at that hospitalization. Denies any CP, cough, fevers, sick contacts.     In ED    EKG: Afib  Trop 74.7, BNP 1680  CXR: B/L effusions  S/p 40mg IV lasix (08 Oct 2024 16:33)      PAST MEDICAL & SURGICAL HISTORY:  HTN (hypertension)      HLD (hyperlipidemia)      DM (diabetes mellitus), type 2      CHF (congestive heart failure)      Afib      DM (diabetes mellitus)      HTN (hypertension)      HLD (hyperlipidemia)      HTN (hypertension)      Atrial fibrillation      DM (diabetes mellitus)      History of knee replacement, right  had surgery three yrs ago          MEDICATIONS  (STANDING):  amLODIPine   Tablet 10 milliGRAM(s) Oral daily  apixaban 5 milliGRAM(s) Oral every 12 hours  clopidogrel Tablet 75 milliGRAM(s) Oral daily  dextrose 5%. 1000 milliLiter(s) (50 mL/Hr) IV Continuous <Continuous>  dextrose 50% Injectable 25 Gram(s) IV Push once  donepezil 5 milliGRAM(s) Oral at bedtime  DULoxetine 30 milliGRAM(s) Oral daily  fluticasone propionate/ salmeterol 100-50 MICROgram(s) Diskus 1 Dose(s) Inhalation two times a day  furosemide   Injectable 20 milliGRAM(s) IV Push daily  glucagon  Injectable 1 milliGRAM(s) IntraMuscular once  influenza  Vaccine (HIGH DOSE) 0.5 milliLiter(s) IntraMuscular once  insulin lispro (ADMELOG) corrective regimen sliding scale   SubCutaneous three times a day before meals  pantoprazole    Tablet 40 milliGRAM(s) Oral before breakfast  QUEtiapine 25 milliGRAM(s) Oral at bedtime  rosuvastatin 20 milliGRAM(s) Oral at bedtime    MEDICATIONS  (PRN):  acetaminophen     Tablet .. 650 milliGRAM(s) Oral every 6 hours PRN Temp greater or equal to 38C (100.4F), Mild Pain (1 - 3)  aluminum hydroxide/magnesium hydroxide/simethicone Suspension 30 milliLiter(s) Oral every 4 hours PRN Dyspepsia  dextrose Oral Gel 15 Gram(s) Oral once PRN Blood Glucose LESS THAN 70 milliGRAM(s)/deciliter  melatonin 3 milliGRAM(s) Oral at bedtime PRN Insomnia  ondansetron Injectable 4 milliGRAM(s) IV Push every 8 hours PRN Nausea and/or Vomiting      FAMILY HISTORY:  No pertinent family history in first degree relatives    No pertinent family history in first degree relatives        SOCIAL HISTORY:    [x ] Non-smoker    [x ] Alcohol-denies    Allergies    No Known Allergies    Intolerances    	    REVIEW OF SYSTEMS:  CONSTITUTIONAL: No fever, weight loss, or fatigue  EYES: No eye pain, visual disturbances, or discharge  ENT:  No difficulty hearing, tinnitus, vertigo; No sinus or throat pain  NECK: No pain or stiffness  RESPIRATORY: No cough, wheezing, chills or hemoptysis; + Shortness of Breath  CARDIOVASCULAR: No chest pain, palpitations, passing out, dizziness, or leg swelling  GASTROINTESTINAL: No abdominal or epigastric pain. No nausea, vomiting, or hematemesis; No diarrhea or constipation. No melena or hematochezia.  GENITOURINARY: No dysuria, frequency, hematuria, or incontinence  NEUROLOGICAL: No headaches, memory loss, loss of strength, numbness, or tremors  SKIN: No itching, burning, rashes, or lesions   LYMPH Nodes: No enlarged glands  ENDOCRINE: No heat or cold intolerance; No hair loss  MUSCULOSKELETAL: No joint pain or swelling; No muscle, back, or extremity pain  PSYCHIATRIC: No depression, anxiety, mood swings, or difficulty sleeping  HEME/LYMPH: No easy bruising, or bleeding gums  ALLERGY AND IMMUNOLOGIC: No hives or eczema	      PHYSICAL EXAM:  T(C): 36.7 (10-10-24 @ 10:23), Max: 37.1 (10-09-24 @ 13:32)  HR: 78 (10-10-24 @ 10:23) (78 - 96)  BP: 122/54 (10-10-24 @ 10:23) (122/54 - 154/99)  RR: 18 (10-10-24 @ 10:23) (17 - 18)  SpO2: 90% (10-10-24 @ 10:23) (86% - 94%)  Wt(kg): --  I&O's Summary      Appearance: Normal	  HEENT:   Normal oral mucosa, PERRL, EOMI	  Lymphatic: No lymphadenopathy  Cardiovascular: Normal S1 S2, No JVD, No murmurs, No edema  Respiratory: Dec BS at bases	  Psychiatry: A & O x 3, Mood & affect appropriate  Gastrointestinal:  Soft, Non-tender, + BS	  Skin: No rashes, No ecchymoses, No cyanosis	  Neurologic: Non-focal  Extremities: Normal range of motion, No clubbing, cyanosis or edema  Vascular: Peripheral pulses palpable 2+ bilaterally    	    ECG: afib,low voltage,q v1-v3 	  	  	  LABS:	 	      Troponin I, High Sensitivity Result: 74.7 ng/L (10-08-24 @ 14:00)                          11.9   7.31  )-----------( 228      ( 10 Oct 2024 06:36 )             36.7     10-10    141  |  104  |  25[H]  ----------------------------<  104[H]  3.9   |  28  |  1.05    Ca    10.0      10 Oct 2024 06:36  Phos  3.5     10-09  Mg     2.0     10-09    TPro  6.3  /  Alb  2.9[L]  /  TBili  0.6  /  DBili  x   /  AST  17  /  ALT  18  /  AlkPhos  61  10-09    < from: TTE W or WO Ultrasound Enhancing Agent (10.09.24 @ 10:27) >  CONCLUSIONS:      1. Technically difficult image quality.   2. Left atrium is mildly dilated.   3. Left ventricular systolic function is normal with an ejection fraction of 50 % by Houston's method of disks.   4. There is increased LV mass and concentric hypertrophy.   5. Mildly enlarged right ventricular cavity size and normal right ventricular systolic function.   6. Estimated pulmonary artery systolic pressure is 53 mmHg, consistent with moderate pulmonary hypertension.   7. Small pericardial effusion with no evidence of hemodynamic compromise (or echocardiographic evidence of cardiac tamponade).   8. Bilateral pleural effusion noted.   9. Report was revised to correct typographical error. There is NO evidence of tamponade physiology.    < end of copied text >

## 2024-10-10 NOTE — DISCHARGE NOTE PROVIDER - HOSPITAL COURSE
Patient is a 73 y/o F with a PmHx of HTN, DM, HLD, asthma, afib (dx 8 years ago), with a 5 day hx of worsening SOB and lower extremity swelling. S/p 40mg IV lasix in ED, CXR with b/l effusions. Patient being admitted for AHRF 2/2 volume overload, likely 2/2 CHF exacerbation, started on IV Lasix. Cardio Dr. Mcknight following    ****INCOMPLETE        Medically optimized for discharge  Discharge discussed with attending  Note this is just a brief course for full course please refer to daily progress and consult notes. Patient is a 75 y/o F with a PmHx of HTN, DM, HLD, asthma, afib (dx 8 years ago), with a 5 day hx of worsening SOB and lower extremity swelling. S/p 40mg IV Lasix in ED, CXR with b/l effusions. Patient admitted for AHRF 2/2 volume overload, likely 2/2 CHF exacerbation, started on IV Lasix. Cards Dr. Mcknight consulted.   Pulm consulted given Hx of asthma, pt to follow up outpt for PFT to r/o KATHLEEN.    Pt weaned off O2 and clinical status improved. Pt ambulating independently.   Cardiology cleared pt for discharge on PO Lasix.     Medically now medically optimized for discharge home. Discharge discussed with attending    Please note that this a brief summary of hospital course please refer to daily progress notes and consult notes for full course and events

## 2024-10-10 NOTE — DISCHARGE NOTE PROVIDER - NSFOLLOWUPCLINICS_GEN_ALL_ED_FT
[facility]  Pulmonology  [address] [place] [address], [place] [address]  Phone: [phone]  Fax:   Follow Up Time: 2 weeks

## 2024-10-10 NOTE — PROGRESS NOTE ADULT - SUBJECTIVE AND OBJECTIVE BOX
NP Note discussed with  Primary Attending    Patient is a 74y old  Female who presents with a chief complaint of AHRF 2/2 CHF (10 Oct 2024 11:09)      INTERVAL HPI/OVERNIGHT EVENTS: no acute events overnight    MEDICATIONS  (STANDING):  amLODIPine   Tablet 10 milliGRAM(s) Oral daily  apixaban 5 milliGRAM(s) Oral every 12 hours  clopidogrel Tablet 75 milliGRAM(s) Oral daily  dextrose 5%. 1000 milliLiter(s) (50 mL/Hr) IV Continuous <Continuous>  dextrose 50% Injectable 25 Gram(s) IV Push once  donepezil 5 milliGRAM(s) Oral at bedtime  DULoxetine 30 milliGRAM(s) Oral daily  fluticasone propionate/ salmeterol 100-50 MICROgram(s) Diskus 1 Dose(s) Inhalation two times a day  furosemide   Injectable 20 milliGRAM(s) IV Push daily  glucagon  Injectable 1 milliGRAM(s) IntraMuscular once  influenza  Vaccine (HIGH DOSE) 0.5 milliLiter(s) IntraMuscular once  insulin lispro (ADMELOG) corrective regimen sliding scale   SubCutaneous three times a day before meals  pantoprazole    Tablet 40 milliGRAM(s) Oral before breakfast  QUEtiapine 25 milliGRAM(s) Oral at bedtime  rosuvastatin 20 milliGRAM(s) Oral at bedtime    MEDICATIONS  (PRN):  acetaminophen     Tablet .. 650 milliGRAM(s) Oral every 6 hours PRN Temp greater or equal to 38C (100.4F), Mild Pain (1 - 3)  aluminum hydroxide/magnesium hydroxide/simethicone Suspension 30 milliLiter(s) Oral every 4 hours PRN Dyspepsia  dextrose Oral Gel 15 Gram(s) Oral once PRN Blood Glucose LESS THAN 70 milliGRAM(s)/deciliter  melatonin 3 milliGRAM(s) Oral at bedtime PRN Insomnia  ondansetron Injectable 4 milliGRAM(s) IV Push every 8 hours PRN Nausea and/or Vomiting      __________________________________________________  REVIEW OF SYSTEMS:    CONSTITUTIONAL: No fever,   EYES: no acute visual disturbances  NECK: No pain or stiffness  RESPIRATORY: No cough; No shortness of breath  CARDIOVASCULAR: No chest pain, no palpitations  GASTROINTESTINAL: No pain. No nausea or vomiting; No diarrhea   NEUROLOGICAL: No headache or numbness, no tremors  MUSCULOSKELETAL: No joint pain, no muscle pain  GENITOURINARY: no dysuria, no frequency, no hesitancy  PSYCHIATRY: no depression , no anxiety  ALL OTHER  ROS negative        Vital Signs Last 24 Hrs  T(C): 36.4 (10 Oct 2024 13:51), Max: 37 (09 Oct 2024 19:27)  T(F): 97.5 (10 Oct 2024 13:51), Max: 98.6 (09 Oct 2024 19:27)  HR: 83 (10 Oct 2024 13:51) (78 - 93)  BP: 118/68 (10 Oct 2024 13:51) (118/68 - 153/56)  BP(mean): 83 (10 Oct 2024 13:51) (73 - 108)  RR: 18 (10 Oct 2024 13:51) (17 - 18)  SpO2: 94% (10 Oct 2024 13:51) (86% - 94%)    Parameters below as of 10 Oct 2024 13:51  Patient On (Oxygen Delivery Method): room air        ________________________________________________  PHYSICAL EXAM:  GENERAL: NAD; + obese   HEENT: Normocephalic  NECK : supple  CHEST/LUNG: Clear to auscultation bilaterally   HEART: S1 S2  regular  ABDOMEN: Soft, Nontender, Nondistended; Bowel sounds present  EXTREMITIES: + b/l LE edema +2  SKIN: warm and dry; no rash  NERVOUS SYSTEM:  Awake and alert; Oriented  to place, person and time    _________________________________________________  LABS:                        11.9   7.31  )-----------( 228      ( 10 Oct 2024 06:36 )             36.7     10-10    141  |  104  |  25[H]  ----------------------------<  104[H]  3.9   |  28  |  1.05    Ca    10.0      10 Oct 2024 06:36  Phos  3.5     10-09  Mg     2.0     10-09    TPro  6.3  /  Alb  2.9[L]  /  TBili  0.6  /  DBili  x   /  AST  17  /  ALT  18  /  AlkPhos  61  10-09      Urinalysis Basic - ( 10 Oct 2024 06:36 )    Color: x / Appearance: x / SG: x / pH: x  Gluc: 104 mg/dL / Ketone: x  / Bili: x / Urobili: x   Blood: x / Protein: x / Nitrite: x   Leuk Esterase: x / RBC: x / WBC x   Sq Epi: x / Non Sq Epi: x / Bacteria: x      CAPILLARY BLOOD GLUCOSE      POCT Blood Glucose.: 119 mg/dL (10 Oct 2024 11:26)  POCT Blood Glucose.: 104 mg/dL (10 Oct 2024 07:52)  POCT Blood Glucose.: 95 mg/dL (09 Oct 2024 21:27)  POCT Blood Glucose.: 95 mg/dL (09 Oct 2024 16:58)        RADIOLOGY & ADDITIONAL TESTS:    < from: Xray Chest 1 View- PORTABLE-Urgent (10.08.24 @ 13:10) >  IMPRESSION:  Cardiomegaly.  Lower zone airspace consolidations and/or effusions.    --- End of Report ---    < end of copied text >    Imaging Personally Reviewed:  YES    Consultant(s) Notes Reviewed:   YES      Plan of care was discussed with patient and /or primary care giver; all questions and concerns were addressed and care was aligned with patient's wishes.

## 2024-10-10 NOTE — DISCHARGE NOTE PROVIDER - NSDCCPCAREPLAN_GEN_ALL_CORE_FT
PRINCIPAL DISCHARGE DIAGNOSIS  Diagnosis: CHF exacerbation  Assessment and Plan of Treatment: You presented to the hospital with shortness of breath and lower extremity swelling, you were started on IV diuresis and your symtpomts improved. The Cardiologist evaluated you and recommended:   ****  you will need to follow up with PCp and Cardiology within 1-2 weeks of discharge for continued maangement and follow up      SECONDARY DISCHARGE DIAGNOSES  Diagnosis: Atrial fibrillation  Assessment and Plan of Treatment: you have a history of atrial fibrillation which is irregular heart beat and you will need to continue your home medications as prescribed.  Please follow up with PCP and or Cardiologist within 1-2 weeks for continued management,      Diagnosis: Hypertension  Assessment and Plan of Treatment: your blood pressure has been controlled and you should continue taking your home medications as prescribed.    Diagnosis: Hyperlipemia  Assessment and Plan of Treatment: continue to take home medications as prescribed.    Diagnosis: Acute respiratory failure with hypoxia  Assessment and Plan of Treatment: you presented to the hospital with shortness of breath and required oxygen, your symtpoms improved with IV diuretics to remove extra fluid you no longer required oxygen     PRINCIPAL DISCHARGE DIAGNOSIS  Diagnosis: CHF exacerbation  Assessment and Plan of Treatment: You presented to the hospital with shortness of breath and lower extremity swelling. This was due to a flare up in your existing heart failure. You were started on IV Lasix to remove excess fluid from your body. Your symtpomts improved and remained stable. You were followed by a  Cardiologist while in the hospital  INSTRUCTIONS:   - Continue Oral Lasix (furosemide) 20mg daily  -Follow up with your Primary care provider Dr. Freitas within 1 week  -Follow up with your cardiologist Dr. Richey within 1 week.  -Weigh yourself daily  **If you gain 3lbs in 3 days, or 5lbs in a week call your Health Care Provider**  -Do not eat or drink foods containing more than 2000mg of salt (sodium) in your diet every day.  -Call your Health Care Provider if you have any swelling or increased swelling in your feet, ankles, and/or stomach.  -Take all of your medication as directed.  If you become dizzy call your Health Care Provider.      SECONDARY DISCHARGE DIAGNOSES  Diagnosis: Acute respiratory failure with hypoxia  Assessment and Plan of Treatment: You presented to the hospital with shortness of breath and required oxygen, your symtpoms improved with IV diuretics (Lasix) to remove extra fluid you no longer required oxygen. You were evaluated by a pulmonologist who recommended you see your outpatient pulmonologist for pulmonary function tests to rule out obstructive sleep apnea.   You may follow up with your own Pulmonologist to schedule this appointment or go to the clinic provided.    Diagnosis: Atrial fibrillation  Assessment and Plan of Treatment: You have a history of atrial fibrillation which is irregular heart beat and you will need to continue your home medications as prescribed.  Please follow up with PCP and or Cardiologist within 1-2 weeks for continued management,      Diagnosis: Hypertension  Assessment and Plan of Treatment: your blood pressure has been controlled and you should continue taking your home medications as prescribed.    Diagnosis: Hyperlipemia  Assessment and Plan of Treatment: continue to take home medications as prescribed.

## 2024-10-10 NOTE — DISCHARGE NOTE PROVIDER - CARE PROVIDERS DIRECT ADDRESSES
,DirectAddress_Unknown,[email or web address]

## 2024-10-10 NOTE — CONSULT NOTE ADULT - ASSESSMENT
73 y/o F with a PmHx of HTN, DM, HLD, asthma, Chronic afib (dx 8 years ago), who presents to the ED with a 5 day hx of worsening SOB, orthopnea and lower extremity swelling,acute diastolic HF,pulmonary hypertension.  1.Tele monitoring.  2.Acute diastolic HF-IV lasix.  3.Chronic afib-eliquis.  4.Pulm HTN-norvasc, consider sleep study-r/o bryant.  5.DM-Insulin.  6.Lipid d/o-statin.  7.PPI.

## 2024-10-10 NOTE — DISCHARGE NOTE PROVIDER - PROVIDER TOKENS
PROVIDER:[TOKEN:[[id number]],FOLLOWUP:[1 week],ESTABLISHEDPATIENT:[T]],PROVIDER:[TOKEN:[[id number]],FOLLOWUP:[1 week],ESTABLISHEDPATIENT:[T]]

## 2024-10-10 NOTE — DISCHARGE NOTE PROVIDER - CARE PROVIDER_API CALL
Dian Freitas  Internal Medicine  [address] [place] [id number]  Phone: [phone]  Fax: [phone]  Established Patient  Follow Up Time: 1 week    Samantha Richey  Cardiovascular Disease  [address] [place] [address]  Phone: [phone]  Fax: [phone]  Established Patient  Follow Up Time: 1 week

## 2024-10-10 NOTE — DISCHARGE NOTE PROVIDER - CONDITIONS AT DISCHARGE
Pt seen at bedside and plan discussed with attending who is in agreement that pt is stable for discharge today

## 2024-10-11 ENCOUNTER — TRANSCRIPTION ENCOUNTER (OUTPATIENT)
Age: 74
End: 2024-10-11

## 2024-10-11 VITALS
SYSTOLIC BLOOD PRESSURE: 125 MMHG | OXYGEN SATURATION: 91 % | DIASTOLIC BLOOD PRESSURE: 68 MMHG | RESPIRATION RATE: 18 BRPM | TEMPERATURE: 98 F | HEART RATE: 73 BPM

## 2024-10-11 LAB
ANION GAP SERPL CALC-SCNC: 6 MMOL/L — SIGNIFICANT CHANGE UP (ref 5–17)
BUN SERPL-MCNC: 23 MG/DL — HIGH (ref 7–18)
CALCIUM SERPL-MCNC: 9.6 MG/DL — SIGNIFICANT CHANGE UP (ref 8.4–10.5)
CHLORIDE SERPL-SCNC: 104 MMOL/L — SIGNIFICANT CHANGE UP (ref 96–108)
CO2 SERPL-SCNC: 31 MMOL/L — SIGNIFICANT CHANGE UP (ref 22–31)
CREAT SERPL-MCNC: 0.94 MG/DL — SIGNIFICANT CHANGE UP (ref 0.5–1.3)
EGFR: 64 ML/MIN/1.73M2 — SIGNIFICANT CHANGE UP
GLUCOSE BLDC GLUCOMTR-MCNC: 118 MG/DL — HIGH (ref 70–99)
GLUCOSE BLDC GLUCOMTR-MCNC: 92 MG/DL — SIGNIFICANT CHANGE UP (ref 70–99)
GLUCOSE SERPL-MCNC: 107 MG/DL — HIGH (ref 70–99)
HCT VFR BLD CALC: 36.1 % — SIGNIFICANT CHANGE UP (ref 34.5–45)
HGB BLD-MCNC: 11.5 G/DL — SIGNIFICANT CHANGE UP (ref 11.5–15.5)
MCHC RBC-ENTMCNC: 29.4 PG — SIGNIFICANT CHANGE UP (ref 27–34)
MCHC RBC-ENTMCNC: 31.9 GM/DL — LOW (ref 32–36)
MCV RBC AUTO: 92.3 FL — SIGNIFICANT CHANGE UP (ref 80–100)
NRBC # BLD: 0 /100 WBCS — SIGNIFICANT CHANGE UP (ref 0–0)
PLATELET # BLD AUTO: 218 K/UL — SIGNIFICANT CHANGE UP (ref 150–400)
POTASSIUM SERPL-MCNC: 3.6 MMOL/L — SIGNIFICANT CHANGE UP (ref 3.5–5.3)
POTASSIUM SERPL-SCNC: 3.6 MMOL/L — SIGNIFICANT CHANGE UP (ref 3.5–5.3)
RBC # BLD: 3.91 M/UL — SIGNIFICANT CHANGE UP (ref 3.8–5.2)
RBC # FLD: 13.2 % — SIGNIFICANT CHANGE UP (ref 10.3–14.5)
SODIUM SERPL-SCNC: 141 MMOL/L — SIGNIFICANT CHANGE UP (ref 135–145)
WBC # BLD: 6.34 K/UL — SIGNIFICANT CHANGE UP (ref 3.8–10.5)
WBC # FLD AUTO: 6.34 K/UL — SIGNIFICANT CHANGE UP (ref 3.8–10.5)

## 2024-10-11 RX ORDER — FUROSEMIDE 10 MG/ML
1 INJECTION INTRAVENOUS
Qty: 30 | Refills: 0
Start: 2024-10-11 | End: 2024-11-09

## 2024-10-11 RX ADMIN — FUROSEMIDE 20 MILLIGRAM(S): 10 INJECTION INTRAVENOUS at 06:34

## 2024-10-11 RX ADMIN — Medication 30 MILLIGRAM(S): at 12:22

## 2024-10-11 RX ADMIN — APIXABAN 5 MILLIGRAM(S): 5 TABLET, FILM COATED ORAL at 06:34

## 2024-10-11 RX ADMIN — PANTOPRAZOLE SODIUM 40 MILLIGRAM(S): 40 TABLET, DELAYED RELEASE ORAL at 06:34

## 2024-10-11 RX ADMIN — Medication 10 MILLIGRAM(S): at 06:35

## 2024-10-11 RX ADMIN — Medication 1 DOSE(S): at 10:17

## 2024-10-11 NOTE — PROGRESS NOTE ADULT - SUBJECTIVE AND OBJECTIVE BOX
Patient is a 74y old  Female who presents with a chief complaint of AHRF 2/2 CHF (10 Oct 2024 18:48)      INTERVAL HPI/OVERNIGHT EVENTS: no new complaints    MEDICATIONS  (STANDING):  amLODIPine   Tablet 10 milliGRAM(s) Oral daily  apixaban 5 milliGRAM(s) Oral every 12 hours  clopidogrel Tablet 75 milliGRAM(s) Oral daily  dextrose 5%. 1000 milliLiter(s) (50 mL/Hr) IV Continuous <Continuous>  dextrose 50% Injectable 25 Gram(s) IV Push once  donepezil 5 milliGRAM(s) Oral at bedtime  DULoxetine 30 milliGRAM(s) Oral daily  fluticasone propionate/ salmeterol 100-50 MICROgram(s) Diskus 1 Dose(s) Inhalation two times a day  furosemide   Injectable 20 milliGRAM(s) IV Push daily  glucagon  Injectable 1 milliGRAM(s) IntraMuscular once  influenza  Vaccine (HIGH DOSE) 0.5 milliLiter(s) IntraMuscular once  insulin lispro (ADMELOG) corrective regimen sliding scale   SubCutaneous three times a day before meals  pantoprazole    Tablet 40 milliGRAM(s) Oral before breakfast  QUEtiapine 25 milliGRAM(s) Oral at bedtime  rosuvastatin 20 milliGRAM(s) Oral at bedtime    MEDICATIONS  (PRN):  acetaminophen     Tablet .. 650 milliGRAM(s) Oral every 6 hours PRN Temp greater or equal to 38C (100.4F), Mild Pain (1 - 3)  aluminum hydroxide/magnesium hydroxide/simethicone Suspension 30 milliLiter(s) Oral every 4 hours PRN Dyspepsia  dextrose Oral Gel 15 Gram(s) Oral once PRN Blood Glucose LESS THAN 70 milliGRAM(s)/deciliter  melatonin 3 milliGRAM(s) Oral at bedtime PRN Insomnia  ondansetron Injectable 4 milliGRAM(s) IV Push every 8 hours PRN Nausea and/or Vomiting      __________________________________________________  REVIEW OF SYSTEMS:    CONSTITUTIONAL: No fever,   EYES: no acute visual disturbances  NECK: No pain or stiffness  RESPIRATORY: No cough; No shortness of breath  CARDIOVASCULAR: No chest pain, no palpitations  GASTROINTESTINAL: No pain. No nausea or vomiting; No diarrhea   NEUROLOGICAL: No headache or numbness, no tremors  MUSCULOSKELETAL: No joint pain, no muscle pain  GENITOURINARY: no dysuria, no frequency, no hesitancy  PSYCHIATRY: no depression , no anxiety  ALL OTHER  ROS negative      Vital Signs Last 24 Hrs  T(C): 36.4 (11 Oct 2024 10:13), Max: 37.3 (11 Oct 2024 05:34)  T(F): 97.5 (11 Oct 2024 10:13), Max: 99.1 (11 Oct 2024 05:34)  HR: 73 (11 Oct 2024 10:13) (73 - 94)  BP: 125/68 (11 Oct 2024 10:13) (118/68 - 146/82)  BP(mean): 85 (11 Oct 2024 10:13) (77 - 85)  RR: 18 (11 Oct 2024 10:13) (17 - 18)  SpO2: 91% (11 Oct 2024 10:13) (91% - 94%)    Parameters below as of 11 Oct 2024 10:13  Patient On (Oxygen Delivery Method): room air        ________________________________________________  PHYSICAL EXAM:  GENERAL: NAD  HEENT: Normocephalic;  conjunctivae and sclerae clear; moist mucous membranes;   NECK : supple  CHEST/LUNG: Clear to auscultation bilaterally with good air entry   HEART: S1 S2  regular; no murmurs, gallops or rubs  ABDOMEN: Soft, Nontender, Nondistended; Bowel sounds present  EXTREMITIES: no cyanosis; no edema; no calf tenderness  SKIN: warm and dry; no rash  NERVOUS SYSTEM:  Awake and alert; Oriented  to place, person and time ; no new deficits    _________________________________________________  LABS:                        11.5   6.34  )-----------( 218      ( 11 Oct 2024 05:37 )             36.1     10-11    141  |  104  |  23[H]  ----------------------------<  107[H]  3.6   |  31  |  0.94    Ca    9.6      11 Oct 2024 05:37        Urinalysis Basic - ( 11 Oct 2024 05:37 )    Color: x / Appearance: x / SG: x / pH: x  Gluc: 107 mg/dL / Ketone: x  / Bili: x / Urobili: x   Blood: x / Protein: x / Nitrite: x   Leuk Esterase: x / RBC: x / WBC x   Sq Epi: x / Non Sq Epi: x / Bacteria: x      CAPILLARY BLOOD GLUCOSE      POCT Blood Glucose.: 92 mg/dL (11 Oct 2024 07:50)  POCT Blood Glucose.: 113 mg/dL (10 Oct 2024 21:21)  POCT Blood Glucose.: 106 mg/dL (10 Oct 2024 16:54)  POCT Blood Glucose.: 119 mg/dL (10 Oct 2024 11:26)      RADIOLOGY & ADDITIONAL TESTS:    Imaging Personally Reviewed:  YES/NO    Consultant(s) Notes Reviewed:   YES/ No    Care Discussed with Consultants :     Plan of care was discussed with patient and /or primary care giver; all questions and concerns were addressed and care was aligned with patient's wishes.       Patient is a 74y old  Female who presents with a chief complaint of AHRF 2/2 CHF (10 Oct 2024 18:48)      INTERVAL HPI/OVERNIGHT EVENTS: pt seen at bedside with no new complaints    MEDICATIONS  (STANDING):  amLODIPine   Tablet 10 milliGRAM(s) Oral daily  apixaban 5 milliGRAM(s) Oral every 12 hours  clopidogrel Tablet 75 milliGRAM(s) Oral daily  dextrose 5%. 1000 milliLiter(s) (50 mL/Hr) IV Continuous <Continuous>  dextrose 50% Injectable 25 Gram(s) IV Push once  donepezil 5 milliGRAM(s) Oral at bedtime  DULoxetine 30 milliGRAM(s) Oral daily  fluticasone propionate/ salmeterol 100-50 MICROgram(s) Diskus 1 Dose(s) Inhalation two times a day  furosemide   Injectable 20 milliGRAM(s) IV Push daily  glucagon  Injectable 1 milliGRAM(s) IntraMuscular once  influenza  Vaccine (HIGH DOSE) 0.5 milliLiter(s) IntraMuscular once  insulin lispro (ADMELOG) corrective regimen sliding scale   SubCutaneous three times a day before meals  pantoprazole    Tablet 40 milliGRAM(s) Oral before breakfast  QUEtiapine 25 milliGRAM(s) Oral at bedtime  rosuvastatin 20 milliGRAM(s) Oral at bedtime    MEDICATIONS  (PRN):  acetaminophen     Tablet .. 650 milliGRAM(s) Oral every 6 hours PRN Temp greater or equal to 38C (100.4F), Mild Pain (1 - 3)  aluminum hydroxide/magnesium hydroxide/simethicone Suspension 30 milliLiter(s) Oral every 4 hours PRN Dyspepsia  dextrose Oral Gel 15 Gram(s) Oral once PRN Blood Glucose LESS THAN 70 milliGRAM(s)/deciliter  melatonin 3 milliGRAM(s) Oral at bedtime PRN Insomnia  ondansetron Injectable 4 milliGRAM(s) IV Push every 8 hours PRN Nausea and/or Vomiting  __________________________________________________  REVIEW OF SYSTEMS:    CONSTITUTIONAL: No fever,   EYES: no acute visual disturbances  NECK: No pain or stiffness  RESPIRATORY: No cough; No shortness of breath  CARDIOVASCULAR: No chest pain, no palpitations  GASTROINTESTINAL: No pain. No nausea or vomiting; No diarrhea   NEUROLOGICAL: No headache or numbness, no tremors  MUSCULOSKELETAL: No joint pain, no muscle pain  GENITOURINARY: no dysuria, no frequency, no hesitancy  PSYCHIATRY: no depression , no anxiety  ALL OTHER  ROS negative      Vital Signs Last 24 Hrs  T(C): 36.4 (11 Oct 2024 10:13), Max: 37.3 (11 Oct 2024 05:34)  T(F): 97.5 (11 Oct 2024 10:13), Max: 99.1 (11 Oct 2024 05:34)  HR: 73 (11 Oct 2024 10:13) (73 - 94)  BP: 125/68 (11 Oct 2024 10:13) (118/68 - 146/82)  BP(mean): 85 (11 Oct 2024 10:13) (77 - 85)  RR: 18 (11 Oct 2024 10:13) (17 - 18)  SpO2: 91% (11 Oct 2024 10:13) (91% - 94%)    Parameters below as of 11 Oct 2024 10:13  Patient On (Oxygen Delivery Method): room air  ________________________________________________  PHYSICAL EXAM:  GENERAL: NAD  HEENT: Normocephalic;  conjunctivae and sclerae clear; moist mucous membranes;   NECK : supple  CHEST/LUNG: Clear to auscultation bilaterally with good air entry   HEART: S1 S2  regular; no murmurs, gallops or rubs  ABDOMEN: Soft, Nontender, Nondistended; Bowel sounds present  EXTREMITIES: no cyanosis; no edema; no calf tenderness  SKIN: warm and dry; no rash  NERVOUS SYSTEM:  Awake and alert; Oriented  to place, person and time ; no new deficits    _________________________________________________  LABS:                        11.5   6.34  )-----------( 218      ( 11 Oct 2024 05:37 )             36.1     10-11    141  |  104  |  23[H]  ----------------------------<  107[H]  3.6   |  31  |  0.94    Ca    9.6      11 Oct 2024 05:37    Urinalysis Basic - ( 11 Oct 2024 05:37 )    Color: x / Appearance: x / SG: x / pH: x  Gluc: 107 mg/dL / Ketone: x  / Bili: x / Urobili: x   Blood: x / Protein: x / Nitrite: x   Leuk Esterase: x / RBC: x / WBC x   Sq Epi: x / Non Sq Epi: x / Bacteria: x      CAPILLARY BLOOD GLUCOSE      POCT Blood Glucose.: 92 mg/dL (11 Oct 2024 07:50)  POCT Blood Glucose.: 113 mg/dL (10 Oct 2024 21:21)  POCT Blood Glucose.: 106 mg/dL (10 Oct 2024 16:54)  POCT Blood Glucose.: 119 mg/dL (10 Oct 2024 11:26)      RADIOLOGY & ADDITIONAL TESTS:  < from: Xray Chest 1 View- PORTABLE-Urgent (10.08.24 @ 13:10) >  ACC: [id number] EXAM:  XR CHEST PORTABLE URGENT 1V   ORDERED BY: SIENNA MOHR     PROCEDURE DATE:  10/08/2024          INTERPRETATION:  Portable AP chest radiograph    COMPARISON: 3/14/2015 chest x-ray.    CLINICAL INFORMATION: Chest Pain.    FINDINGS:  CATHETERS AND TUBES: None    PULMONARY: Pulmonary vascular congestion.  Bilateral lower zone airspace consolidations and/or moderate effusion   obscuring diaphragm contours. Apices clear.  No pneumothorax.    HEART/VASCULAR: The  heart is enlarged in transverse diameter.    BONES: The visualized osseous thorax is intact.    IMPRESSION:  Cardiomegaly.  Lower zone airspace consolidations and/or effusions.    --- End of Report ---      < end of copied text >  < from: TTE W or WO Ultrasound Enhancing Agent (10.09.24 @ 10:27) >  ADDENDUM TO PRIOR ECHOCARDIOGRAM REPORT        Antony Giles MD on 10/10/2024 at 10:42:57 AM                        TRANSTHORACIC ECHOCARDIOGRAM REPORT  ________________________________________________________________________________                      _______       Pt. Name:       SARATH ELENA Study Date:    10/9/2024  MRN:            [id number]       YOB: 1950  Accession #:    [id number]      Age:           74 years  Account#:       [id number]     Gender:        F  Heart Rate:                    Height:        66.93 in (170.00 cm)  Rhythm:                        Weight:        279.00 lb (126.55 kg)  Blood Pressure: 155/76 mmHg    BSA/BMI:       2.33 m² / 43.79 kg/m²  ________________________________________________________________________________________  Referring Physician:    [id number] Dionisio Hanley  Interpreting Physician: Antony Giles MD  Primary Sonographer:    Azeb Torres CHRISTOPH    CPT:               ECHO TTE WO CON COMP W DOPP - 26706.m  Indication(s):     Chest pain, unspecified - R07.9  Procedure:         Transthoracic echocardiogram with 2-D, M-mode and complete                     spectral and color flow Doppler.  Ordering Location: [facility]  Admission Status:  Inpatient  Study Information: Image quality for this study is technically difficult.    _______________________________________________________________________________________     CONCLUSIONS:      1. Technically difficult image quality.   2. Left atrium is mildly dilated.   3. Left ventricular systolic function is normal with an ejection fraction of 50 % by Houston's method of disks.   4. There is increased LV mass and concentric hypertrophy.   5. Mildly enlarged right ventricular cavity size and normal right ventricular systolic function.   6. Estimated pulmonary artery systolic pressure is 53 mmHg, consistent with moderate pulmonary hypertension.   7. Small pericardial effusion with no evidence of hemodynamic compromise (or echocardiographic evidence of cardiac tamponade).   8. Bilateral pleural effusion noted.   9. Report was revised to correct typographical error. There is NO evidence of tamponade physiology.    ________________________________________________________________________________________  FINDINGS:     Left Ventricle:  Left ventricular systolic function is normal with a calculated ejection fraction of 50 % by the Houston's biplane method of disks. There is increased LV mass and concentric hypertrophy. Analysis of left ventricular diastolic functionand filling pressure is made challenging by the presence of atrial fibrillation.     Right Ventricle:  The right ventricular cavity is mildly enlarged in size and right ventricular systolic function is normal.     Left Atrium:  The left atrium is mildly dilated with an indexed volume of 34.16 ml/m².     Right Atrium:  The right atrium is normal in size.     Aortic Valve:  There is mild aortic regurgitation.     Mitral Valve:  There is mild calcification of the mitral valve annulus. There is mild mitral regurgitation.     Tricuspid Valve:  There is mild to moderate tricuspid regurgitation. Estimated pulmonary artery systolic pressure is 53 mmHg, consistent with moderate pulmonary hypertension.     Pulmonic Valve:  There is trace pulmonic regurgitation.     Pericardium:  There is a small pericardial effusion measuring 0.90 cm with no evidence of hemodynamic compromise (or echocardiographic evidence of cardiac tamponade).     Pleura:  Bilateral pleural effusion noted.  ____________________________________________________________________  QUANTITATIVE DATA:  Left Ventricle Measurements: (Indexed to BSA)     IVSd (2D):   1.4 cm  LVPWd (2D):  1.3 cm  LVIDd (2D):  5.3 cm  LVIDs (2D):  3.5 cm  LV Mass:     300 g  128.9 g/m²  LV Vol d, MOD A2C: 59.5 ml 25.54 ml/m²  LV Vol d, MOD A4C: 34.5 ml 14.83 ml/m²  LV Vol d, MOD BP:  47.1 ml 20.22 ml/m²  LV Vol s, MOD A2C: 21.8 ml 9.37 ml/m²  LV Vol s, MOD A4C: 24.9 ml 10.71 ml/m²  LV Vol s, MOD BP:  23.3 ml 10.02 ml/m²  LVOT SV MOD BP:    23.7 ml  LV EF% MOD BP:     50 %     MV E Vmax: 1.04 m/s  MV DT:     178 msec    Aorta Measurements: (Normal range) (Indexed to BSA)     Ao Root 3.7 cm            Left Atrium Measurements: (Indexed to BSA)  LA Diam 2D: 5.14 cm            LVOT / RVOT/ Qp/Qs Data: (Indexed to BSA)  LVOT Diameter:  1.74 cm  LVOT Area:      2.37 cm²  LVOT Vmax:      1.23 m/s  LVOT VTI:       30.46 cm  LVOT peak grad: 6 mmHg  LVOT mean grad: 3.5 mmHg  LVOT SV:        72.3 ml  31.04 ml/m²    Aortic Valve Measurements:  AV Vmax:             2.2 m/s  AV Peak Gradient:       20.0 mmHg  AV Mean Gradient:       12.0 mmHg  AV VTI:                 49.8 cm  AV VTI Ratio:           0.61  AoV EOA, Contin:        1.45 cm²  AoV EOA, Contin i:      0.62 cm²/m²  AoV Dimensionless Index 0.61  AR Vmax                 4.12 m/s  AR PHT                  465 msec  AR [place]                2.57 m/s²  AR Decel Time           1603 msec    Mitral Valve Measurements:     MV E Vmax: 1.0 m/s       Tricuspid Valve Measurements:     TR Vmax:  3.3 m/s  TR Peak Gradient: 44.8 mmHg  RA Pressure:      8 mmHg  PASP:             53 mmHg    ________________________________________________________________________________________  Electronically signed on 10/9/2024 at 3:24:14 PM by Antony Giles MD         *** Final (Updated) ***    < end of copied text >      Imaging Personally Reviewed:  YES/NO    Consultant(s) Notes Reviewed:   YES/ No    Care Discussed with Consultants :     Plan of care was discussed with patient and /or primary care giver; all questions and concerns were addressed and care was aligned with patient's wishes.

## 2024-10-11 NOTE — PROGRESS NOTE ADULT - PROBLEM SELECTOR PLAN 4
h/o HTN on bystolic, amlodipine  Monitor BP  c/w amlodipine  BP controlled
h/o HTN on bystolic, amlodipine  Monitor BP  c/w home meds
h/o HTN on bystolic, amlodipine  Monitor BP  c/w amlodipine  BP controlled

## 2024-10-11 NOTE — PROGRESS NOTE ADULT - PROBLEM SELECTOR PLAN 8
patient from home  continuing IV diuresis  titrate O2 as Spo2 allows
patient from home  Cleared by cards to change to PO lasix  Weaned off O2  outpt f/u with Cards and Pulm    -Discussed discharge plan with pt's daughter

## 2024-10-11 NOTE — PROGRESS NOTE ADULT - SUBJECTIVE AND OBJECTIVE BOX
Time of Visit:  Patient seen and examined. pat is lying in bed comfortable , on ambient air     MEDICATIONS  (STANDING):  amLODIPine   Tablet 10 milliGRAM(s) Oral daily  apixaban 5 milliGRAM(s) Oral every 12 hours  clopidogrel Tablet 75 milliGRAM(s) Oral daily  dextrose 5%. 1000 milliLiter(s) (50 mL/Hr) IV Continuous <Continuous>  dextrose 50% Injectable 25 Gram(s) IV Push once  donepezil 5 milliGRAM(s) Oral at bedtime  DULoxetine 30 milliGRAM(s) Oral daily  fluticasone propionate/ salmeterol 100-50 MICROgram(s) Diskus 1 Dose(s) Inhalation two times a day  furosemide   Injectable 20 milliGRAM(s) IV Push daily  glucagon  Injectable 1 milliGRAM(s) IntraMuscular once  influenza  Vaccine (HIGH DOSE) 0.5 milliLiter(s) IntraMuscular once  insulin lispro (ADMELOG) corrective regimen sliding scale   SubCutaneous three times a day before meals  pantoprazole    Tablet 40 milliGRAM(s) Oral before breakfast  QUEtiapine 25 milliGRAM(s) Oral at bedtime  rosuvastatin 20 milliGRAM(s) Oral at bedtime      MEDICATIONS  (PRN):  acetaminophen     Tablet .. 650 milliGRAM(s) Oral every 6 hours PRN Temp greater or equal to 38C (100.4F), Mild Pain (1 - 3)  aluminum hydroxide/magnesium hydroxide/simethicone Suspension 30 milliLiter(s) Oral every 4 hours PRN Dyspepsia  dextrose Oral Gel 15 Gram(s) Oral once PRN Blood Glucose LESS THAN 70 milliGRAM(s)/deciliter  melatonin 3 milliGRAM(s) Oral at bedtime PRN Insomnia  ondansetron Injectable 4 milliGRAM(s) IV Push every 8 hours PRN Nausea and/or Vomiting       Medications up to date at time of exam.      PHYSICAL EXAMINATION:  Patient has no new complaints.  GENERAL: The patient  in no apparent distress.     Vital Signs Last 24 Hrs  T(C): 36.4 (11 Oct 2024 10:13), Max: 37.3 (11 Oct 2024 05:34)  T(F): 97.5 (11 Oct 2024 10:13), Max: 99.1 (11 Oct 2024 05:34)  HR: 73 (11 Oct 2024 10:13) (73 - 94)  BP: 125/68 (11 Oct 2024 10:13) (120/59 - 146/82)  BP(mean): 85 (11 Oct 2024 10:13) (77 - 85)  RR: 18 (11 Oct 2024 10:13) (17 - 18)  SpO2: 91% (11 Oct 2024 10:13) (91% - 93%)    Parameters below as of 11 Oct 2024 10:13  Patient On (Oxygen Delivery Method): room air       (if applicable)    Chest Tube (if applicable)    HEENT: Head is normocephalic and atraumatic. Extraocular muscles are intact. Mucous membranes are moist.     NECK: Supple, no palpable adenopathy.    LUNGS: Fair bilateral air entry   no wheezing, rales, or rhonchi.    HEART: Regular rate and rhythm without murmur.    ABDOMEN: Soft, nontender, and nondistended.  No hepatosplenomegaly is noted.    : No painful voiding, no pelvic pain    EXTREMITIES: Without any cyanosis, clubbing, rash, lesions or edema.    NEUROLOGIC: Awake, alert, oriented, grossly intact    SKIN: Warm, dry, good turgor.      LABS:                        11.5   6.34  )-----------( 218      ( 11 Oct 2024 05:37 )             36.1     10-11    141  |  104  |  23[H]  ----------------------------<  107[H]  3.6   |  31  |  0.94    Ca    9.6      11 Oct 2024 05:37        Urinalysis Basic - ( 11 Oct 2024 05:37 )    Color: x / Appearance: x / SG: x / pH: x  Gluc: 107 mg/dL / Ketone: x  / Bili: x / Urobili: x   Blood: x / Protein: x / Nitrite: x   Leuk Esterase: x / RBC: x / WBC x   Sq Epi: x / Non Sq Epi: x / Bacteria: x      MICROBIOLOGY: (if applicable)    RADIOLOGY & ADDITIONAL STUDIES:  EKG:   CXR:  ECHO:< from: TTE W or WO Ultrasound Enhancing Agent (10.09.24 @ 10:27) >  CONCLUSIONS:      1. Technically difficult image quality.   2. Left atrium is mildly dilated.   3. Left ventricular systolic function is normal with an ejection fraction of 50 % by Houston's method of disks.   4. There is increased LV mass and concentric hypertrophy.   5. Mildly enlarged right ventricular cavity size and normal right ventricular systolic function.   6. Estimated pulmonary artery systolic pressure is 53 mmHg, consistent with moderate pulmonary hypertension.   7. Small pericardial effusion with no evidence of hemodynamic compromise (or echocardiographic evidence of cardiac tamponade).   8. Bilateral pleural effusion noted.   9. Report was revised to correct typographical error. There is NO evidence of tamponade physiology.    < end of copied text >      IMPRESSION: 74y Female PAST MEDICAL & SURGICAL HISTORY:  HTN (hypertension)      HLD (hyperlipidemia)      DM (diabetes mellitus), type 2      CHF (congestive heart failure)      Afib      DM (diabetes mellitus)      HTN (hypertension)      HLD (hyperlipidemia)      HTN (hypertension)      Atrial fibrillation      DM (diabetes mellitus)      History of knee replacement, right  had surgery three yrs ago       p/w       IMP:  This is a  74 yr old woman with  HTN, DM, HLD, asthma, afib (dx 8 years ago), who presents to the ED with a 5 day hx of worsening SOB, orthopnea and lower extremity swelling due to ex of HF resulting in acute hypoxic resp failure . Clinically improved with lasix . Asthma  is control     Sugg    - Continue O2 supp as needed to maintain sat >90%  - Diuresis   - Monitor blood glucose with coverage   - Repeat CXR in 48 hrs   - Anticoag for Afib   - Out pat pul f/u   - Out pat work up of pHTN

## 2024-10-11 NOTE — PROGRESS NOTE ADULT - ATTENDING COMMENTS
Pt appearing to have lapsed into acute on CHF(with 3-4 pillow orthopnea), exacerbated and decompensated likely by being in concurrent a-fib. Pt is assumed to be approximately 30-40lbs into fluid overload with goal to achieve this much in negative fluid balance.
Pt appearing to have lapsed into acute on CHF(with 3-4 pillow orthopnea), exacerbated and decompensated likely by being in concurrent a-fib. Pt is assumed to be approximately 30-40lbs into fluid overload with goal to achieve this much in negative fluid balance.    10/10/24- Noted with subjective improvement and decreased orthopnea since initiation of IV diuresis. Possible d/c planning for home in a.m. on PO lasix and above instructed measures to achieve negative fluid balance pending cardio's concurrence.
Pt appearing to have lapsed into acute on CHF(with 3-4 pillow orthopnea), exacerbated and decompensated likely by being in concurrent a-fib. Pt is assumed to be approximately 30-40lbs into fluid overload with goal to achieve this much in negative fluid balance.    10/10/24- Noted with subjective improvement and decreased orthopnea since initiation of IV diuresis. Possible d/c planning for home in a.m. on PO lasix and above instructed measures to achieve negative fluid balance pending cardio's concurrence.    10/11/24 -D/C planning for home in a.m. on PO lasix and above instructional measures to achieve negative fluid balance.

## 2024-10-11 NOTE — PROGRESS NOTE ADULT - PROBLEM SELECTOR PLAN 5
- pt takes rosuvastatin 20 mg at home  - c/w atorvastatin 40 mg interchange  - f/u lipid profile  - Dash Diet
- pt takes rosuvastatin 20 mg at home  - c/w atorvastatin 40 mg interchange  - lipid profile WNL  - Dash Diet
- pt takes rosuvastatin 20 mg at home  - c/w atorvastatin 40 mg interchange  - f/u lipid profile  - Dash Diet

## 2024-10-11 NOTE — DISCHARGE NOTE NURSING/CASE MANAGEMENT/SOCIAL WORK - NSDCPEFALRISK_GEN_ALL_CORE
For information on Fall & Injury Prevention, visit: [email or web address] OR  [email or web address] OR  https://www.cdc.gov/steadi/patient.html

## 2024-10-11 NOTE — PROGRESS NOTE ADULT - PROBLEM SELECTOR PLAN 3
Known hx of afib  Not in RVR  cont eliquis  takes Bystolic at home
Known hx of afib  Not in RVR  cont eliquis  takes Bystolic at home
Known hx of afib  Not in RVR    -cont home BB  -cont eliquis

## 2024-10-11 NOTE — PROGRESS NOTE ADULT - SUBJECTIVE AND OBJECTIVE BOX
Date of Service 10-11-24 @ 11:37    CHIEF COMPLAINT:Patient is a 74y old  Female who presents with a chief complaint of AHRF 2/2 CHF .Pt appears comfortable.    	  REVIEW OF SYSTEMS:  CONSTITUTIONAL: No fever, weight loss, or fatigue  EYES: No eye pain, visual disturbances, or discharge  ENT:  No difficulty hearing, tinnitus, vertigo; No sinus or throat pain  NECK: No pain or stiffness  RESPIRATORY: No cough, wheezing, chills or hemoptysis; No Shortness of Breath  CARDIOVASCULAR: No chest pain, palpitations, passing out, dizziness, or leg swelling  GASTROINTESTINAL: No abdominal or epigastric pain. No nausea, vomiting, or hematemesis; No diarrhea or constipation. No melena or hematochezia.  GENITOURINARY: No dysuria, frequency, hematuria, or incontinence  NEUROLOGICAL: No headaches, memory loss, loss of strength, numbness, or tremors  SKIN: No itching, burning, rashes, or lesions   LYMPH Nodes: No enlarged glands  ENDOCRINE: No heat or cold intolerance; No hair loss  MUSCULOSKELETAL: No joint pain or swelling; No muscle, back, or extremity pain  PSYCHIATRIC: No depression, anxiety, mood swings, or difficulty sleeping  HEME/LYMPH: No easy bruising, or bleeding gums  ALLERGY AND IMMUNOLOGIC: No hives or eczema	      PHYSICAL EXAM:  T(C): 36.4 (10-11-24 @ 10:13), Max: 37.3 (10-11-24 @ 05:34)  HR: 73 (10-11-24 @ 10:13) (73 - 94)  BP: 125/68 (10-11-24 @ 10:13) (118/68 - 146/82)  RR: 18 (10-11-24 @ 10:13) (17 - 18)  SpO2: 91% (10-11-24 @ 10:13) (91% - 94%)  Wt(kg): --  I&O's Summary      Appearance: Normal	  HEENT:   Normal oral mucosa, PERRL, EOMI	  Lymphatic: No lymphadenopathy  Cardiovascular: Normal S1 S2, No JVD, No murmurs, No edema  Respiratory:Dec BS at bases  Psychiatry: A & O x 3, Mood & affect appropriate  Gastrointestinal:  Soft, Non-tender, + BS	  Skin: No rashes, No ecchymoses, No cyanosis	  Neurologic: Non-focal  Extremities: Normal range of motion, No clubbing, cyanosis or edema  Vascular: Peripheral pulses palpable 2+ bilaterally    MEDICATIONS  (STANDING):  amLODIPine   Tablet 10 milliGRAM(s) Oral daily  apixaban 5 milliGRAM(s) Oral every 12 hours  clopidogrel Tablet 75 milliGRAM(s) Oral daily  dextrose 5%. 1000 milliLiter(s) (50 mL/Hr) IV Continuous <Continuous>  dextrose 50% Injectable 25 Gram(s) IV Push once  donepezil 5 milliGRAM(s) Oral at bedtime  DULoxetine 30 milliGRAM(s) Oral daily  fluticasone propionate/ salmeterol 100-50 MICROgram(s) Diskus 1 Dose(s) Inhalation two times a day  furosemide   Injectable 20 milliGRAM(s) IV Push daily  glucagon  Injectable 1 milliGRAM(s) IntraMuscular once  influenza  Vaccine (HIGH DOSE) 0.5 milliLiter(s) IntraMuscular once  insulin lispro (ADMELOG) corrective regimen sliding scale   SubCutaneous three times a day before meals  pantoprazole    Tablet 40 milliGRAM(s) Oral before breakfast  QUEtiapine 25 milliGRAM(s) Oral at bedtime  rosuvastatin 20 milliGRAM(s) Oral at bedtime        LABS:	 	        Troponin I, High Sensitivity Result: 74.7 ng/L (10-08 @ 14:00)                            11.5   6.34  )-----------( 218      ( 11 Oct 2024 05:37 )             36.1     10-11    141  |  104  |  23[H]  ----------------------------<  107[H]  3.6   |  31  |  0.94    Ca    9.6      11 Oct 2024 05:37      proBNP:   Lipid Profile: Cholesterol 112  LDL --  HDL 48  TG 64  Ldl calc 51  Ratio --    HgA1c:   TSH: Thyroid Stimulating Hormone, Serum: 2.37 uU/mL (10-09 @ 06:41)

## 2024-10-11 NOTE — PROGRESS NOTE ADULT - PROBLEM SELECTOR PLAN 6
h/o DM on pioglitazone, miguel,    will  hold oral dm meds while inpatient   f/u A1c  low corrective sliding scale  Adjust insulin as indicated
h/o DM on pioglitazone, miguel,    will  hold oral dm meds while inpatient   A1c: 5.9  low corrective sliding scale  Adjust insulin as indicated  glucose controlled
h/o DM on pioglitazone, miguel,    will  hold oral dm meds while inpatient   A1c: 5.9  low corrective sliding scale  Adjust insulin as indicated  glucose controlled

## 2024-10-11 NOTE — PROGRESS NOTE ADULT - PROBLEM SELECTOR PLAN 1
p/w sob + LE edema x5 days  CXR shows b/l effusions  EKG showing afib  BNP 1680   takes bystolic, amlodipine at home    -c/w home meds  -S/p IV lasix 40mg in ED. monitor and consider continuing IV lasix in AM   -f/u echo  -Remote tele  -Troponin 74.7  -daily weights, strict I&O  -Cardio Consulted: Dr. Mcknight
p/w sob + LE edema x5 days  CXR shows b/l effusions  EKG showing afib  BNP 1680   takes bystolic, amlodipine at home  c/w amlodipine  c/w IV lasix  ECHO as above  Remote tele  daily weights, strict I&O  Cardio Dr. Mcknight
p/w sob + LE edema x5 days  CXR shows b/l effusions  EKG showing afib  BNP 1680   takes bystolic, amlodipine at home  c/w amlodipine  c/w IV lasix  ECHO as above  Remote tele  daily weights, strict I&O  Cardio Dr. Mcknight

## 2024-10-11 NOTE — DISCHARGE NOTE NURSING/CASE MANAGEMENT/SOCIAL WORK - PATIENT PORTAL LINK FT
You can access the FollowMyHealth Patient Portal offered by [facility] by registering at the following website: [email or web address]. By joining [employer]’s FollowMyHealth portal, you will also be able to view your health information using other applications (apps) compatible with our system.

## 2024-10-11 NOTE — PROGRESS NOTE ADULT - ASSESSMENT
73 y/o F with a PmHx of HTN, DM, HLD, asthma, Chronic afib (dx 8 years ago), who presents to the ED with a 5 day hx of worsening SOB, orthopnea and lower extremity swelling,acute diastolic HF,pulmonary hypertension.  1.Asthma-stable.  2.Acute diastolic HF-change IV lasix 20mg qd to po 20mg qd..  3.Chronic afib-eliquis.  4.Pulm HTN-norvasc, consider sleep study-r/o bryant.  5.DM-Insulin.  6.Lipid d/o-statin.  7.PPI.   
Patient is a 73 y/o F with a PmHx of HTN, DM, HLD, asthma, afib (dx 8 years ago), with a 5 day hx of worsening SOB and lower extremity swelling. S/p 40mg IV lasix in ED, CXR with b/l effusions. Patient being admitted for AHRF 2/2 volume overload, likely 2/2 CHF exacerbation, started on IV Lasix. Cardio Dr. Mcknight following
Patient is a 73 y/o F with a PmHx of HTN, DM, HLD, asthma, afib (dx 8 years ago), with a 5 day hx of worsening SOB and lower extremity swelling. S/p 40mg IV lasix in ED, CXR with b/l effusions. Patient being admitted for AHRF 2/2 volume overload, likely 2/2 CHF exacerbation, started on IV Lasix. Cardio Dr. Mcknight following  10/11: Pt weaned off O2 sating 94% on RA and ambulating independently
Patient is a 75 y/o F with a PmHx of HTN, DM, HLD, asthma, afib (dx 8 years ago), with a 5 day hx of worsening SOB and lower extremity swelling. S/p 40mg IV lasix in ED, CXR with b/l effusions. Patient being admitted for AHRF 2/2 volume overload, likely 2/2 CHF exacerbation

## 2024-11-26 PROCEDURE — 80053 COMPREHEN METABOLIC PANEL: CPT

## 2024-11-26 PROCEDURE — 84484 ASSAY OF TROPONIN QUANT: CPT

## 2024-11-26 PROCEDURE — 86803 HEPATITIS C AB TEST: CPT

## 2024-11-26 PROCEDURE — 82803 BLOOD GASES ANY COMBINATION: CPT

## 2024-11-26 PROCEDURE — 83690 ASSAY OF LIPASE: CPT

## 2024-11-26 PROCEDURE — 99285 EMERGENCY DEPT VISIT HI MDM: CPT | Mod: 25

## 2024-11-26 PROCEDURE — 36415 COLL VENOUS BLD VENIPUNCTURE: CPT

## 2024-11-26 PROCEDURE — 94640 AIRWAY INHALATION TREATMENT: CPT

## 2024-11-26 PROCEDURE — 93306 TTE W/DOPPLER COMPLETE: CPT

## 2024-11-26 PROCEDURE — 85027 COMPLETE CBC AUTOMATED: CPT

## 2024-11-26 PROCEDURE — 85025 COMPLETE CBC W/AUTO DIFF WBC: CPT

## 2024-11-26 PROCEDURE — 86703 HIV-1/HIV-2 1 RESULT ANTBDY: CPT

## 2024-11-26 PROCEDURE — 80048 BASIC METABOLIC PNL TOTAL CA: CPT

## 2024-11-26 PROCEDURE — 83880 ASSAY OF NATRIURETIC PEPTIDE: CPT

## 2024-11-26 PROCEDURE — 71045 X-RAY EXAM CHEST 1 VIEW: CPT

## 2024-11-26 PROCEDURE — 84100 ASSAY OF PHOSPHORUS: CPT

## 2024-11-26 PROCEDURE — 83036 HEMOGLOBIN GLYCOSYLATED A1C: CPT

## 2024-11-26 PROCEDURE — 84443 ASSAY THYROID STIM HORMONE: CPT

## 2024-11-26 PROCEDURE — 93005 ELECTROCARDIOGRAM TRACING: CPT

## 2024-11-26 PROCEDURE — 80061 LIPID PANEL: CPT

## 2024-11-26 PROCEDURE — 82962 GLUCOSE BLOOD TEST: CPT

## 2024-11-26 PROCEDURE — 83735 ASSAY OF MAGNESIUM: CPT
